# Patient Record
Sex: MALE | Race: AMERICAN INDIAN OR ALASKA NATIVE | NOT HISPANIC OR LATINO | Employment: FULL TIME | URBAN - METROPOLITAN AREA
[De-identification: names, ages, dates, MRNs, and addresses within clinical notes are randomized per-mention and may not be internally consistent; named-entity substitution may affect disease eponyms.]

---
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
## 2021-02-03 ENCOUNTER — HOSPITAL ENCOUNTER (EMERGENCY)
Facility: CLINIC | Age: 21
Discharge: HOME | End: 2021-02-03
Attending: FAMILY MEDICINE | Admitting: FAMILY MEDICINE
Payer: COMMERCIAL

## 2021-02-03 ENCOUNTER — APPOINTMENT (OUTPATIENT)
Dept: CT IMAGING | Facility: CLINIC | Age: 21
End: 2021-02-03
Attending: FAMILY MEDICINE
Payer: COMMERCIAL

## 2021-02-03 VITALS
HEART RATE: 82 BPM | WEIGHT: 259 LBS | HEIGHT: 70 IN | DIASTOLIC BLOOD PRESSURE: 85 MMHG | RESPIRATION RATE: 16 BRPM | OXYGEN SATURATION: 98 % | BODY MASS INDEX: 37.08 KG/M2 | SYSTOLIC BLOOD PRESSURE: 121 MMHG | TEMPERATURE: 97.9 F

## 2021-02-03 DIAGNOSIS — F12.90 MARIJUANA USE, CONTINUOUS: ICD-10-CM

## 2021-02-03 DIAGNOSIS — R56.9 SEIZURE (H): ICD-10-CM

## 2021-02-03 LAB
ALBUMIN SERPL-MCNC: 4.3 G/DL (ref 3.4–5)
ALP SERPL-CCNC: 48 U/L (ref 40–150)
ALT SERPL W P-5'-P-CCNC: 40 U/L (ref 0–70)
AMPHETAMINES UR QL SCN: NEGATIVE
ANION GAP SERPL CALCULATED.3IONS-SCNC: 3 MMOL/L (ref 3–14)
AST SERPL W P-5'-P-CCNC: 24 U/L (ref 0–45)
BARBITURATES UR QL: NEGATIVE
BASOPHILS # BLD AUTO: 0 10E9/L (ref 0–0.2)
BASOPHILS NFR BLD AUTO: 0.4 %
BENZODIAZ UR QL: NEGATIVE
BILIRUB SERPL-MCNC: 0.3 MG/DL (ref 0.2–1.3)
BUN SERPL-MCNC: 13 MG/DL (ref 7–30)
CALCIUM SERPL-MCNC: 9.2 MG/DL (ref 8.5–10.1)
CANNABINOIDS UR QL SCN: POSITIVE
CHLORIDE SERPL-SCNC: 109 MMOL/L (ref 94–109)
CO2 SERPL-SCNC: 25 MMOL/L (ref 20–32)
COCAINE UR QL: NEGATIVE
CREAT SERPL-MCNC: 0.62 MG/DL (ref 0.66–1.25)
DIFFERENTIAL METHOD BLD: ABNORMAL
EOSINOPHIL # BLD AUTO: 0.3 10E9/L (ref 0–0.7)
EOSINOPHIL NFR BLD AUTO: 3.1 %
ERYTHROCYTE [DISTWIDTH] IN BLOOD BY AUTOMATED COUNT: 13 % (ref 10–15)
ETHANOL SERPL-MCNC: <0.01 G/DL
GFR SERPL CREATININE-BSD FRML MDRD: >90 ML/MIN/{1.73_M2}
GLUCOSE SERPL-MCNC: 95 MG/DL (ref 70–99)
HCT VFR BLD AUTO: 46.7 % (ref 40–53)
HGB BLD-MCNC: 15.2 G/DL (ref 13.3–17.7)
IMM GRANULOCYTES # BLD: 0 10E9/L (ref 0–0.4)
IMM GRANULOCYTES NFR BLD: 0.2 %
LABORATORY COMMENT REPORT: NORMAL
LYMPHOCYTES # BLD AUTO: 2.3 10E9/L (ref 0.8–5.3)
LYMPHOCYTES NFR BLD AUTO: 27.1 %
MAGNESIUM SERPL-MCNC: 1.7 MG/DL (ref 1.6–2.3)
MCH RBC QN AUTO: 29.7 PG (ref 26.5–33)
MCHC RBC AUTO-ENTMCNC: 32.5 G/DL (ref 31.5–36.5)
MCV RBC AUTO: 91 FL (ref 78–100)
MONOCYTES # BLD AUTO: 0.7 10E9/L (ref 0–1.3)
MONOCYTES NFR BLD AUTO: 8.4 %
NEUTROPHILS # BLD AUTO: 5.1 10E9/L (ref 1.6–8.3)
NEUTROPHILS NFR BLD AUTO: 60.8 %
NRBC # BLD AUTO: 0 10*3/UL
NRBC BLD AUTO-RTO: 0 /100
OPIATES UR QL SCN: NEGATIVE
PCP UR QL SCN: NEGATIVE
PLATELET # BLD AUTO: 136 10E9/L (ref 150–450)
POTASSIUM SERPL-SCNC: 3.8 MMOL/L (ref 3.4–5.3)
PROT SERPL-MCNC: 7.5 G/DL (ref 6.8–8.8)
RBC # BLD AUTO: 5.12 10E12/L (ref 4.4–5.9)
SARS-COV-2 RNA RESP QL NAA+PROBE: NEGATIVE
SODIUM SERPL-SCNC: 137 MMOL/L (ref 133–144)
SPECIMEN SOURCE: NORMAL
WBC # BLD AUTO: 8.4 10E9/L (ref 4–11)

## 2021-02-03 PROCEDURE — 80053 COMPREHEN METABOLIC PANEL: CPT | Performed by: FAMILY MEDICINE

## 2021-02-03 PROCEDURE — 99284 EMERGENCY DEPT VISIT MOD MDM: CPT | Performed by: FAMILY MEDICINE

## 2021-02-03 PROCEDURE — 83735 ASSAY OF MAGNESIUM: CPT | Performed by: FAMILY MEDICINE

## 2021-02-03 PROCEDURE — 85025 COMPLETE CBC W/AUTO DIFF WBC: CPT | Performed by: FAMILY MEDICINE

## 2021-02-03 PROCEDURE — 70450 CT HEAD/BRAIN W/O DYE: CPT

## 2021-02-03 PROCEDURE — C9803 HOPD COVID-19 SPEC COLLECT: HCPCS | Performed by: FAMILY MEDICINE

## 2021-02-03 PROCEDURE — 87635 SARS-COV-2 COVID-19 AMP PRB: CPT | Performed by: FAMILY MEDICINE

## 2021-02-03 PROCEDURE — 99284 EMERGENCY DEPT VISIT MOD MDM: CPT | Mod: 25 | Performed by: FAMILY MEDICINE

## 2021-02-03 PROCEDURE — 80307 DRUG TEST PRSMV CHEM ANLYZR: CPT | Performed by: FAMILY MEDICINE

## 2021-02-03 PROCEDURE — 82077 ASSAY SPEC XCP UR&BREATH IA: CPT | Performed by: FAMILY MEDICINE

## 2021-02-03 ASSESSMENT — MIFFLIN-ST. JEOR: SCORE: 2191.07

## 2021-02-04 NOTE — ED NOTES
"Pt reports no hx of seizure activity.  Pt reports having meeting yesterday from , pt was on xbox and became tired and laid down and woke up with  Seizure activity at 1500.  Per pt's SO that was present during \"seizure activity\"   Pt was foaming mouth, wouldn't stop moving, bit tongue and shaking for approximately 3-4 minutes.   Pt denies incontinence of urine or bowel during seizure like activity. EMS arrived on scene and pt refused transport.    Blood sugar normal on scene.     Pt came to ER tonight due to family wanting pt to be seen.   Pt's only c/o feeling sore with muscle aches.     "

## 2021-02-04 NOTE — DISCHARGE INSTRUCTIONS
Please call for an appointment with neurology at the contact number given.  Return to the emergency department if worse or changes or recurrent seizure concern.  You should stop smoking marijuana.

## 2021-02-04 NOTE — ED TRIAGE NOTES
Patient states seizure yesterday and EMS was called. He declined admission to hospital at the time but wants to be checked on now.

## 2021-02-15 ENCOUNTER — HOSPITAL ENCOUNTER (EMERGENCY)
Facility: CLINIC | Age: 21
Discharge: HOME | End: 2021-02-15
Attending: PHYSICIAN ASSISTANT | Admitting: PHYSICIAN ASSISTANT
Payer: COMMERCIAL

## 2021-02-15 ENCOUNTER — APPOINTMENT (OUTPATIENT)
Dept: CT IMAGING | Facility: CLINIC | Age: 21
End: 2021-02-15
Attending: EMERGENCY MEDICINE
Payer: COMMERCIAL

## 2021-02-15 VITALS
HEART RATE: 65 BPM | DIASTOLIC BLOOD PRESSURE: 64 MMHG | RESPIRATION RATE: 18 BRPM | TEMPERATURE: 98.9 F | WEIGHT: 275 LBS | SYSTOLIC BLOOD PRESSURE: 104 MMHG | OXYGEN SATURATION: 97 % | BODY MASS INDEX: 39.46 KG/M2

## 2021-02-15 DIAGNOSIS — L02.91 ABSCESS: ICD-10-CM

## 2021-02-15 DIAGNOSIS — K42.9 UMBILICAL HERNIA: ICD-10-CM

## 2021-02-15 LAB
ANION GAP SERPL CALCULATED.3IONS-SCNC: 5 MMOL/L (ref 3–14)
BASOPHILS # BLD AUTO: 0 10E9/L (ref 0–0.2)
BASOPHILS NFR BLD AUTO: 0 %
BUN SERPL-MCNC: 6 MG/DL (ref 7–30)
CALCIUM SERPL-MCNC: 8.8 MG/DL (ref 8.5–10.1)
CHLORIDE SERPL-SCNC: 105 MMOL/L (ref 94–109)
CO2 SERPL-SCNC: 27 MMOL/L (ref 20–32)
CREAT SERPL-MCNC: 0.6 MG/DL (ref 0.66–1.25)
DIFFERENTIAL METHOD BLD: ABNORMAL
EOSINOPHIL # BLD AUTO: 0 10E9/L (ref 0–0.7)
EOSINOPHIL NFR BLD AUTO: 0 %
ERYTHROCYTE [DISTWIDTH] IN BLOOD BY AUTOMATED COUNT: 12.4 % (ref 10–15)
GFR SERPL CREATININE-BSD FRML MDRD: >90 ML/MIN/{1.73_M2}
GLUCOSE SERPL-MCNC: 81 MG/DL (ref 70–99)
HCT VFR BLD AUTO: 46.2 % (ref 40–53)
HGB BLD-MCNC: 15.5 G/DL (ref 13.3–17.7)
LACTATE BLD-SCNC: 0.5 MMOL/L (ref 0.7–2)
LYMPHOCYTES # BLD AUTO: 2.6 10E9/L (ref 0.8–5.3)
LYMPHOCYTES NFR BLD AUTO: 29 %
MCH RBC QN AUTO: 29.8 PG (ref 26.5–33)
MCHC RBC AUTO-ENTMCNC: 33.5 G/DL (ref 31.5–36.5)
MCV RBC AUTO: 89 FL (ref 78–100)
MONOCYTES # BLD AUTO: 0.7 10E9/L (ref 0–1.3)
MONOCYTES NFR BLD AUTO: 8 %
NEUTROPHILS # BLD AUTO: 5.7 10E9/L (ref 1.6–8.3)
NEUTROPHILS NFR BLD AUTO: 63 %
PLATELET # BLD AUTO: 137 10E9/L (ref 150–450)
PLATELET # BLD EST: ABNORMAL 10*3/UL
POTASSIUM SERPL-SCNC: 4 MMOL/L (ref 3.4–5.3)
RBC # BLD AUTO: 5.21 10E12/L (ref 4.4–5.9)
RBC MORPH BLD: NORMAL
SODIUM SERPL-SCNC: 137 MMOL/L (ref 133–144)
VARIANT LYMPHS BLD QL SMEAR: PRESENT
WBC # BLD AUTO: 9.1 10E9/L (ref 4–11)

## 2021-02-15 PROCEDURE — 99285 EMERGENCY DEPT VISIT HI MDM: CPT | Mod: 25 | Performed by: EMERGENCY MEDICINE

## 2021-02-15 PROCEDURE — 96375 TX/PRO/DX INJ NEW DRUG ADDON: CPT | Mod: 59 | Performed by: EMERGENCY MEDICINE

## 2021-02-15 PROCEDURE — 250N000011 HC RX IP 250 OP 636: Performed by: EMERGENCY MEDICINE

## 2021-02-15 PROCEDURE — 10060 I&D ABSCESS SIMPLE/SINGLE: CPT | Performed by: EMERGENCY MEDICINE

## 2021-02-15 PROCEDURE — 83605 ASSAY OF LACTIC ACID: CPT | Performed by: EMERGENCY MEDICINE

## 2021-02-15 PROCEDURE — 74177 CT ABD & PELVIS W/CONTRAST: CPT

## 2021-02-15 PROCEDURE — 250N000009 HC RX 250: Performed by: EMERGENCY MEDICINE

## 2021-02-15 PROCEDURE — 85025 COMPLETE CBC W/AUTO DIFF WBC: CPT | Performed by: EMERGENCY MEDICINE

## 2021-02-15 PROCEDURE — 80048 BASIC METABOLIC PNL TOTAL CA: CPT | Performed by: EMERGENCY MEDICINE

## 2021-02-15 PROCEDURE — 96374 THER/PROPH/DIAG INJ IV PUSH: CPT | Mod: 59 | Performed by: EMERGENCY MEDICINE

## 2021-02-15 RX ORDER — FLUMAZENIL 0.1 MG/ML
0.2 INJECTION, SOLUTION INTRAVENOUS ONCE
Status: DISCONTINUED | OUTPATIENT
Start: 2021-02-15 | End: 2021-02-15

## 2021-02-15 RX ORDER — ONDANSETRON 2 MG/ML
4 INJECTION INTRAMUSCULAR; INTRAVENOUS EVERY 30 MIN PRN
Status: DISCONTINUED | OUTPATIENT
Start: 2021-02-15 | End: 2021-02-16 | Stop reason: HOSPADM

## 2021-02-15 RX ORDER — IOPAMIDOL 755 MG/ML
100 INJECTION, SOLUTION INTRAVASCULAR ONCE
Status: COMPLETED | OUTPATIENT
Start: 2021-02-15 | End: 2021-02-15

## 2021-02-15 RX ORDER — HYDROMORPHONE HYDROCHLORIDE 1 MG/ML
0.5 INJECTION, SOLUTION INTRAMUSCULAR; INTRAVENOUS; SUBCUTANEOUS
Status: DISCONTINUED | OUTPATIENT
Start: 2021-02-15 | End: 2021-02-16 | Stop reason: HOSPADM

## 2021-02-15 RX ADMIN — SODIUM CHLORIDE 80 ML: 9 INJECTION, SOLUTION INTRAVENOUS at 20:52

## 2021-02-15 RX ADMIN — ONDANSETRON 4 MG: 2 INJECTION INTRAMUSCULAR; INTRAVENOUS at 20:38

## 2021-02-15 RX ADMIN — HYDROMORPHONE HYDROCHLORIDE 0.5 MG: 1 INJECTION, SOLUTION INTRAMUSCULAR; INTRAVENOUS; SUBCUTANEOUS at 20:38

## 2021-02-15 RX ADMIN — IOPAMIDOL 100 ML: 755 INJECTION, SOLUTION INTRAVENOUS at 20:52

## 2021-02-15 ASSESSMENT — ENCOUNTER SYMPTOMS
PHOTOPHOBIA: 0
BACK PAIN: 0
VOMITING: 1
ABDOMINAL DISTENTION: 0
PSYCHIATRIC NEGATIVE: 1
CONSTIPATION: 0
FEVER: 0
NEUROLOGICAL NEGATIVE: 1
ACTIVITY CHANGE: 0
LIGHT-HEADEDNESS: 0
HEADACHES: 0
FATIGUE: 0
SORE THROAT: 0
CARDIOVASCULAR NEGATIVE: 1
DIARRHEA: 0
MUSCULOSKELETAL NEGATIVE: 1
CHILLS: 0
APPETITE CHANGE: 1
NAUSEA: 1
COUGH: 0
COLOR CHANGE: 1
SHORTNESS OF BREATH: 0
DIFFICULTY URINATING: 0
RESPIRATORY NEGATIVE: 1
ABDOMINAL PAIN: 1

## 2021-02-16 NOTE — ED PROVIDER NOTES
History     Chief Complaint   Patient presents with     Abdominal Pain     Pt has a possible umbilical hernia (small in size)     HPI  Brendan Garay is a 21 year old male with history of PTSD, and depression who originally presented to urgent care for evaluation of abdominal pain.  Pain is located under his umbilicus and has been constant and progressively worsening over the past 3 days.  He can feel a lump which is tender and painful.  He was initially evaluated in urgent care and concern for umbilical hernia and was transferred to the ED. Pain worsens with movement particularly bending over.  No fevers or chills.  Pain is moderate in severity.  Had one episode of nausea and vomiting earlier today.  Patient thought it could be related to constipation so took magnesium citrate yesterday.  Symptoms did not improve.  No prior abdominal surgeries.    The patient's PMHx, Surgical Hx, Allergies, and Medications were all reviewed with the patient.    Allergies:  Allergies   Allergen Reactions     Seasonal Allergies        Problem List:    Patient Active Problem List    Diagnosis Date Noted     PTSD (post-traumatic stress disorder) 04/03/2018     Priority: Medium     Depression 02/27/2018     Priority: Medium     Childhood obesity 02/13/2012     Priority: Medium     Heart murmur 02/13/2012     Priority: Medium     ADHD (attention deficit hyperactivity disorder) 02/13/2012     Priority: Medium     Health Care Home 05/20/2013     Priority: Low     EMERGENCY CARE PLAN  May 20, 2013: No current Care Coordination follow up planned. Please refer if Care Coordination services are needed.    Presenting Problem Signs and Symptoms Treatment Plan   Questions or concerns   during clinic hours   I will call my clinic directly:  [facility]  [id number] [place] [address] [id number] [phone].    Questions or concerns outside clinic hours   I will call the 24 hour nurse line at   [phone] or [phone].   Need  to schedule an appointment   I will call the 24 hour scheduling team at [phone] or my clinic directly at [phone].    Same day treatment     I will call my clinic first, nurse line if after hours, urgent care and express care if needed.   Clinic care coordination services (regular clinic hours)     I will call a clinic care coordinator directly:     Pramod Long RN  Mon, Tues, Fri - [phone]  Wed, Thurs - [phone]    Jerri Laws :    [phone]    Or call my clinic at [phone] and ask to speak with care coordination.   Crisis Services: Behavioral or Mental Health  [facility] 24 Hour Phone Line  [phone]    [facility] 24 Hour Crisis Services  [phone]    [facility] [phone]    [facility]   [phone]       Emergency treatment -- Immediately    CAll 911             Past Medical History:    Past Medical History:   Diagnosis Date     Depressive disorder        Past Surgical History:    Past Surgical History:   Procedure Laterality Date     NO HISTORY OF SURGERY         Family History:    Family History   Problem Relation Age of Onset     Substance Abuse Mother      Arthritis Father      Anxiety Disorder Father      Depression Father      Substance Abuse Father        Social History:  Marital Status:  Single [1]  Social History     Tobacco Use     Smoking status: Former Smoker     Packs/day: 0.50     Types: Cigarettes     Quit date: 2021     Years since quittin.1     Smokeless tobacco: Never Used   Substance Use Topics     Alcohol use: No     Alcohol/week: 0.0 standard drinks     Drug use: Yes     Types: Marijuana, Opiates, Benzodiazepines        Medications:         amphetamine-dextroamphetamine (ADDERALL XR) 20 MG per 24 hr capsule       ibuprofen (ADVIL/MOTRIN) 600 MG tablet       loratadine (CLARITIN) 10 MG tablet       Multiple Vitamin (MULTI-VITAMIN PO)       sertraline (ZOLOFT) 100 MG tablet        tretinoin (RETIN-A) 0.025 % cream          Review of Systems   Constitutional: Negative for chills and fever.   HENT: Negative for sore throat.    Eyes: Negative for photophobia.   Respiratory: Negative for cough and shortness of breath.    Cardiovascular: Negative for chest pain and leg swelling.   Gastrointestinal: Positive for abdominal pain, nausea and vomiting. Negative for constipation and diarrhea.   Genitourinary: Negative for difficulty urinating.   Musculoskeletal: Negative for back pain.   Skin: Positive for color change.   Neurological: Negative for light-headedness and headaches.       Physical Exam   BP: 132/76  Pulse: 64  Temp: 98.9  F (37.2  C)  Resp: 18  Weight: 124.7 kg (275 lb)  SpO2: 100 %    Physical Exam  GEN: Awake, alert, and cooperative.  Appears mildly stressed but nontoxic.  HENT: MMM. External ears and nose normal bilaterally.  EYES: EOM intact. Conjunctiva clear. No discharge.   NECK: Symmetric, freely mobile.   CV : Regular rate and rhythm.  Extremities warm and well perfused.  Capillary refill brisk  PULM: Normal effort. No wheezes, rales, or rhonchi bilaterally.  ABD: Palpable lesion in umbilicus which is tender and erythematous concerning for umbilical hernia.  In the umbilicus there is a small spherical tense erythematous nodule with a small amount of serous drainage.  No peritoneal signs.  Remainder of abdomen nontender.  NEURO: Normal speech. Following commands. CN II-XII grossly intact. Answering questions and interacting appropriately.   EXT: No gross deformity. Warm and well perfused  INT: Warm. No diaphoresis. Normal color.        ED Course        [facility]    PROCEDURE: -Incision/Drainage    Date/Time: 2/15/2021 11:16 PM  Performed by: Carlo Zuleta MD  Authorized by: Carlo Zuleta MD       LOCATION:      Type:  Abscess    Size:  1    Location:  Trunk    Trunk location:  Abdomen    ANESTHESIA (see MAR for exact dosages):      Anesthesia method:  Local infiltration    Local anesthetic:  Lidocaine 1% WITH epi    PROCEDURE TYPE:     Complexity:  Simple    PROCEDURE DETAILS:     Needle aspiration: no      Incision types:  Stab incision    Incision depth:  Dermal    Scalpel blade:  11    Wound management:  Probed and deloculated    Drainage:  Purulent    Drainage amount:  Moderate    Wound treatment:  Wound left open    Packing materials:  None  Post-procedure details:     PROCEDURE   Patient Tolerance:  Patient tolerated the procedure well with no immediate complications                 Critical Care time:  none               Results for orders placed or performed during the hospital encounter of 02/15/21 (from the past 24 hour(s))   CBC with platelets differential   Result Value Ref Range    WBC 9.1 4.0 - 11.0 10e9/L    RBC Count 5.21 4.4 - 5.9 10e12/L    Hemoglobin 15.5 13.3 - 17.7 g/dL    Hematocrit 46.2 40.0 - 53.0 %    MCV 89 78 - 100 fl    MCH 29.8 26.5 - 33.0 pg    MCHC 33.5 31.5 - 36.5 g/dL    RDW 12.4 10.0 - 15.0 %    Platelet Count 137 (L) 150 - 450 10e9/L    Diff Method Manual Differential     % Neutrophils 63.0 %    % Lymphocytes 29.0 %    % Monocytes 8.0 %    % Eosinophils 0.0 %    % Basophils 0.0 %    Absolute Neutrophil 5.7 1.6 - 8.3 10e9/L    Absolute Lymphocytes 2.6 0.8 - 5.3 10e9/L    Absolute Monocytes 0.7 0.0 - 1.3 10e9/L    Absolute Eosinophils 0.0 0.0 - 0.7 10e9/L    Absolute Basophils 0.0 0.0 - 0.2 10e9/L    Reactive Lymphs Present     RBC Morphology Normal     Platelet Estimate Confirming automated cell count    Basic metabolic panel   Result Value Ref Range    Sodium 137 133 - 144 mmol/L    Potassium 4.0 3.4 - 5.3 mmol/L    Chloride 105 94 - 109 mmol/L    Carbon Dioxide 27 20 - 32 mmol/L    Anion Gap 5 3 - 14 mmol/L    Glucose 81 70 - 99 mg/dL    Urea Nitrogen 6 (L) 7 - 30 mg/dL    Creatinine 0.60 (L) 0.66 - 1.25 mg/dL    GFR Estimate >90 >60 mL/min/[1.73_m2]    GFR Estimate If Black >90 >60 mL/min/[1.73_m2]     Calcium 8.8 8.5 - 10.1 mg/dL   Lactic acid whole blood   Result Value Ref Range    Lactic Acid 0.5 (L) 0.7 - 2.0 mmol/L   CT Abdomen Pelvis w Contrast    Narrative    EXAM: CT ABDOMEN PELVIS W CONTRAST  LOCATION: [facility]  DATE/TIME: 2/15/2021 8:51 PM    INDICATION: Abdominal pain, umbilical hernia suspected  COMPARISON: None.  TECHNIQUE: CT scan of the abdomen and pelvis was performed following injection of IV contrast. Multiplanar reformats were obtained. Dose reduction techniques were used.  CONTRAST: 100mL Isovue-370    FINDINGS:   LOWER CHEST: Normal. Incidental subcentimeter calcified granuloma in the left lower lobe.    HEPATOBILIARY: Mild fatty infiltration. No suspicious liver lesion. No calcified gallstones.    PANCREAS: Normal.    SPLEEN: Normal.    ADRENAL GLANDS: Normal.    KIDNEYS/BLADDER: No significant mass, stone, or hydronephrosis.    BOWEL: No bowel obstruction. No free air, free fluid, or inflammatory change.     There is a 1.2 x 2.2 x 0.8 cm mildly enhancing umbilical fluid collection. There is mild adjacent subcutaneous inflammatory change. Tiny adjacent fat-containing umbilical hernia.    LYMPH NODES: Normal.    VASCULATURE: Unremarkable.    PELVIC ORGANS: Normal.    MUSCULOSKELETAL: Normal.      Impression    IMPRESSION:   1.  Small umbilical abscess suggested.  2.  Tiny fat-containing umbilical hernia.       Medications   ondansetron (ZOFRAN) injection 4 mg (4 mg Intravenous Given 2/15/21 2038)   HYDROmorphone (PF) (DILAUDID) injection 0.5 mg (0.5 mg Intravenous Given 2/15/21 2038)   iopamidol (ISOVUE-370) solution 100 mL (100 mLs Intravenous Given 2/15/21 2052)   sodium chloride 0.9 % bag 500mL for CT scan flush use (80 mLs As instructed Given 2/15/21 2052)       Assessments & Plan (with Medical Decision Making)   21 year old male with 3 days of periumbilical pain with tender and red lesion and umbilicus.  On abdominal exam he does appear to have a small umbilical hernia as  well as concern for abscess.  CBC without leukocytosis or anemia.  BMP grossly normal.  Lactic acid is not elevated.  CT scan of abdomen and pelvis with 1.2 x 2.2 x 0.8 cm enhancing umbilical fluid collection suggestive of abscess as well as a tiny adjacent fat-containing umbilical hernia.  Discussed options with patient and elected to proceed with I&D.  Purulent discharge from I&D.  Wound care and follow-up instructions discussed with patient.  He expresses agreement understanding of plan and discharged in improved condition.  Plan for ibuprofen as needed for pain control.  No signs of adjacent cellulitis, no antimicrobial therapy initiated.    I have reviewed the nursing notes.         Discharge Medication List as of 2/15/2021 10:38 PM          Final diagnoses:   Umbilical hernia   Abscess - periumbilical     Carlo Zuleta MD    2/15/2021   [facility] EMERGENCY DEPT    Disclaimer: This note consists of words and symbols derived from keyboarding and dictation using voice recognition software.  As a result, there may be errors that have gone undetected.  Please consider this when interpreting information found in this note.             Carlo Zuleta MD  02/15/21 1700

## 2021-02-16 NOTE — DISCHARGE INSTRUCTIONS
You have a small abscess overlying your bellybutton.  I recommend warm soaks and bathtub and allow the abscess to drain.  If it reforms becomes tense it may need to be drained again.  If you develop any redness, pain, or warmth of the skin around your bellybutton you should return to the emergency department for further evaluation.    You have a small umbilical hernia which does not contain any bowel.  I recommend ibuprofen for pain control.

## 2021-02-16 NOTE — ED PROVIDER NOTES
History     Chief Complaint   Patient presents with     Abdominal Pain     Pt has a possible umbilical hernia (small in size)     HPI  Brendan Garay is a 21 year old male with history of PTSD, depression, heart murmur, ADHD who presents to the urgent care and transferred to the Emergency Room for abdominal pain. Patient states 3 days ago he started noticing periumbilical abdominal pain that has been constant and slightly worsening over the past few days.  Patient also notes a lump that is tender, swollen and painful in his umbilicus. Patient states he thought this may be related to constipation and took some mag citrate yesterday. Patient has had some small bowel movements. Patient denies fevers, diarrhea, bloody or black tarry stools. Patient with 1 episode of emesis today. Patient states similar symptoms a few years back and he was told it was constipation, so that is why he tried the mag citrate.     Allergies:  Allergies   Allergen Reactions     Seasonal Allergies        Problem List:    Patient Active Problem List    Diagnosis Date Noted     PTSD (post-traumatic stress disorder) 04/03/2018     Priority: Medium     Depression 02/27/2018     Priority: Medium     Childhood obesity 02/13/2012     Priority: Medium     Heart murmur 02/13/2012     Priority: Medium     ADHD (attention deficit hyperactivity disorder) 02/13/2012     Priority: Medium     Health Care Home 05/20/2013     Priority: Low     EMERGENCY CARE PLAN  May 20, 2013: No current Care Coordination follow up planned. Please refer if Care Coordination services are needed.    Presenting Problem Signs and Symptoms Treatment Plan   Questions or concerns   during clinic hours   I will call my clinic directly:  [address] [id number] [phone].    Questions or concerns outside clinic hours   I will call the 24 hour nurse line at   [phone] or [phone]-[phone].   Need to schedule an appointment   I will call  the 24 hour scheduling team at [phone] or my clinic directly at [phone].    Same day treatment     I will call my clinic first, nurse line if after hours, urgent care and express care if needed.   Clinic care coordination services (regular clinic hours)     I will call a clinic care coordinator directly:     Pramod Long RN  Mon, Tu, Fri - [phone]  Wed, Thurs - [phone]    Jerri Laws :    [phone]    Or call my clinic at [phone] and ask to speak with care coordination.   Crisis Services: Behavioral or Mental Health  [facility] 24 Hour Phone Line  [phone]    [facility] 24 Hour Crisis Services  [phone]    [facility] ([facility]) Network [phone]    [facility]   [phone]       Emergency treatment -- Immediately    CAll 911             Past Medical History:    Past Medical History:   Diagnosis Date     Depressive disorder        Past Surgical History:    Past Surgical History:   Procedure Laterality Date     NO HISTORY OF SURGERY         Family History:    Family History   Problem Relation Age of Onset     Substance Abuse Mother      Arthritis Father      Anxiety Disorder Father      Depression Father      Substance Abuse Father        Social History:  Marital Status:  Single [1]  Social History     Tobacco Use     Smoking status: Former Smoker     Packs/day: 0.50     Types: Cigarettes     Quit date: 2021     Years since quittin.1     Smokeless tobacco: Never Used   Substance Use Topics     Alcohol use: No     Alcohol/week: 0.0 standard drinks     Drug use: Yes     Types: Marijuana, Opiates, Benzodiazepines        Medications:         amphetamine-dextroamphetamine (ADDERALL XR) 20 MG per 24 hr capsule       ibuprofen (ADVIL/MOTRIN) 600 MG tablet       loratadine (CLARITIN) 10 MG tablet       Multiple Vitamin (MULTI-VITAMIN PO)       sertraline (ZOLOFT) 100 MG tablet       tretinoin (RETIN-A) 0.025 %  "cream          Review of Systems   Constitutional: Positive for appetite change. Negative for activity change, fatigue and fever.   HENT: Negative.    Respiratory: Negative.    Cardiovascular: Negative.    Gastrointestinal: Positive for abdominal pain and vomiting. Negative for abdominal distention and diarrhea.        Umbilical \"lump\" with pain and redness.    Genitourinary: Negative.    Musculoskeletal: Negative.    Skin: Negative.    Neurological: Negative.    Psychiatric/Behavioral: Negative.    All other systems reviewed and are negative.      Physical Exam   BP: 132/76  Pulse: 64  Temp: 98.9  F (37.2  C)  Resp: 18  Weight: 124.7 kg (275 lb)  SpO2: 100 %      Physical Exam  Vitals signs and nursing note reviewed.   Constitutional:       General: He is not in acute distress.     Appearance: He is well-developed and normal weight. He is not ill-appearing or toxic-appearing.   Eyes:      General: No scleral icterus.     Extraocular Movements: Extraocular movements intact.      Pupils: Pupils are equal, round, and reactive to light.   Cardiovascular:      Rate and Rhythm: Normal rate and regular rhythm.      Heart sounds: Normal heart sounds.   Pulmonary:      Effort: Pulmonary effort is normal.      Breath sounds: Normal breath sounds.   Abdominal:      General: Abdomen is protuberant. Bowel sounds are normal.      Palpations: Abdomen is soft.      Tenderness: There is abdominal tenderness in the periumbilical area. There is no guarding or rebound.      Hernia: A hernia is present. Hernia is present in the umbilical area (that is red, swollen, and tender. unable to reduce hernia in exam room today. ).   Skin:     General: Skin is warm.      Capillary Refill: Capillary refill takes less than 2 seconds.      Findings: Erythema (to the umbilical hernia ) present.   Neurological:      General: No focal deficit present.      Mental Status: He is alert and oriented to person, place, and time.   Psychiatric:         Mood " "and Affect: Mood normal.         Behavior: Behavior normal.         ED Course        Procedures              Critical Care time:  none               No results found for this or any previous visit (from the past 24 hour(s)).    Medications - No data to display    Assessments & Plan (with Medical Decision Making)     I have reviewed the nursing notes.    I have reviewed the findings, diagnosis, plan and need for follow up with the patient.   21-year-old male presents the urgent care today with 3 days of abdominal pain and umbilical \"lump\" that has been painful, red and swollen.  See exam findings above.  Exam findings consistent with possibly strangulated or incarcerated umbilical hernia.  Patient sent over the emergency department from the urgent care for further evaluation work-up.  Patient transferred to the ER in stable condition in no acute distress.    New Prescriptions    No medications on file       Final diagnoses:   Umbilical hernia       2/15/2021   [facility] EMERGENCY DEPT     Rosi Arguello PA-C  02/15/21 1938    "

## 2021-03-08 ENCOUNTER — PRE VISIT (OUTPATIENT)
Dept: NEUROLOGY | Facility: CLINIC | Age: 21
End: 2021-03-08

## 2021-03-08 NOTE — TELEPHONE ENCOUNTER
FUTURE VISIT INFORMATION:    Date: 3/9/21    Time:  1440    Location: [facility]   REFERRAL INFORMATION:    Referring provider:  [facility]    Referring providers clinic:     Reason for visit/diagnosis:  Possible first seizure       NOTES (FOR ALL VISITS) STATUS DETAILS   OFFICE NOTE from referring provider Printed    OFFICE NOTE from other specialist Printed     DISCHARGE SUMMARY from hospital       DISCHARGE REPORT from the ER Printed [facility] 2/3/21   MEDICATION LIST In Epic     IMAGING  (FOR ALL VISITS)       EMG       EEG    None   MRI (HEAD, NECK, SPINE) Printed  Images in PACs CT head 2/3/21   CARDIAC STUDIES        FORMAL COGNITIVE TESTING       OTHER

## 2021-03-09 ENCOUNTER — OFFICE VISIT (OUTPATIENT)
Dept: NEUROLOGY | Facility: CLINIC | Age: 21
End: 2021-03-09
Payer: COMMERCIAL

## 2021-03-09 VITALS — TEMPERATURE: 98.3 F | SYSTOLIC BLOOD PRESSURE: 117 MMHG | HEART RATE: 72 BPM | DIASTOLIC BLOOD PRESSURE: 65 MMHG

## 2021-03-09 DIAGNOSIS — R56.9 FIRST TIME SEIZURE (H): Primary | ICD-10-CM

## 2021-03-09 PROCEDURE — 99204 OFFICE O/P NEW MOD 45 MIN: CPT | Performed by: PSYCHIATRY & NEUROLOGY

## 2021-03-09 NOTE — PROGRESS NOTES
INITIAL NEUROLOGY CONSULTATION    DATE OF VISIT: 3/9/2021  MRN: [id number]  PATIENT NAME: Brendan Garay  YOB: 2000    REFERRING PROVIDER: No ref. provider found    Chief Complaint   Patient presents with     New Patient     Possible first seizure.  Referral by [facility] ED       SUBJECTIVE:                                                      HPI:   Brendan Garay is a 21 year old male whom I have been asked by [place] ED team to see in consultation for possible first-time seizure. Per chart review, the patient was in the ED on 2.3.21 discerns of seizure activity that occurred the day prior. The patient himself did not remember much about the details except for laying down for a nap. His also said that she saw him shaking and foaming at the mouth for a few minutes thereafter. He was confused when he awoke. He had possibly bitten his tongue. No loss of bladder or bowel control. He was seen by EMS who recommended ED evaluation but he declined and decided to wait until the next day. Possible evidence of mild tongue trauma on the ED exam, otherwise work-up was largely normal.  Basic labs showed slightly decreased platelet count and creatinine level.  Head CT was unremarkable. Urine was positive for cannabinoids, and the patient reported he smokes marijuana daily. He was advised to stop and follow-up with neurology as an outpatient.    The patient has additional history of PTSD, depression, ADHD and obesity.    Arnulfo tells me that he remembers waking up to the EMS providers. He says he was confused afterwards, so he refused to go into the emergency room.  Family was so worried though, he says that he was agreeable to come in for evaluation the following day.  He does endorse that he had some cuts on his tongue after the event.  His girlfriend told him that he was convulsing.  No major changes in lifestyle around the time of the event.  He has been smoking marijuana for quite some time, and always gets  it from the same source.  No synthetic marijuana.  He drinks an occasional beer, but no history of heavy alcohol use.  He says he was not necessarily sleep deprived or sick at the time of the event.    Denies history of previous seizures.  He would occasionally zone out a little bit in school but nothing that was problematic or unexpected with his ADHD.  No history of major head injuries or CNS infection.  Have history of migraine headaches since a young age.  He says that these finally seem to dissipate after he stopped taking Adderall a couple of years ago.    The patient denies any known family history of seizure disorder.    Tells me he has been told that he cannot drive for 3 months.  He says this is not a problem because he does not currently have his license.  He works at [email or web address] in [email or web address].    Past Medical History:   Diagnosis Date     Depressive disorder      Past Surgical History:   Procedure Laterality Date     NO HISTORY OF SURGERY         ibuprofen (ADVIL/MOTRIN) 600 MG tablet, Take 1 tablet (600 mg) by mouth every 6 hours as needed for moderate pain  loratadine (CLARITIN) 10 MG tablet, Take 1 tablet (10 mg) by mouth daily  Multiple Vitamin (MULTI-VITAMIN PO), Take 1 tablet by mouth daily.  tretinoin (RETIN-A) 0.025 % cream, Spread a pea size amount into affected area topically at bedtime.  Use sunscreen SPF>20.  amphetamine-dextroamphetamine (ADDERALL XR) 20 MG per 24 hr capsule, Take 2 capsules (40 mg) by mouth daily (Patient not taking: Reported on 3/9/2021)  sertraline (ZOLOFT) 100 MG tablet, Take 1 tablet (100 mg) by mouth daily (Patient not taking: Reported on 3/9/2021)    acetaminophen (TYLENOL) tablet 650 mg  calcium carbonate (TUMS) chewable tablet 1,000 mg  ibuprofen (ADVIL/MOTRIN) tablet 400 mg      Allergies   Allergen Reactions     Seasonal Allergies         Problem (# of Occurrences) Relation (Name,Age of Onset)    Anxiety Disorder (1) Father (Brendan)    Arthritis (1) Father (Brendan)     Depression (1) Father (Brendan)    Substance Abuse (2) Mother (Mary), Father (Brendan)        Social History     Tobacco Use     Smoking status: Former Smoker     Packs/day: 0.50     Types: Cigarettes     Quit date: 2021     Years since quittin.1     Smokeless tobacco: Never Used     Tobacco comment: E-cig twice daily   Substance Use Topics     Alcohol use: No     Alcohol/week: 0.0 standard drinks     Drug use: Not Currently     Types: Marijuana, Opiates, Benzodiazepines     Comment: 3/9/21 marijuana only       REVIEW OF SYSTEMS:                                                      10-point review of systems is negative except as mentioned above in HPI.     EXAM:                                                      Physical Exam:   Vitals: /65 (BP Location: Right arm, Patient Position: Sitting, Cuff Size: Adult Large)   Pulse 72   Temp 98.3  F (36.8  C) (Tympanic)   BMI= There is no height or weight on file to calculate BMI.  GENERAL: NAD.  HEENT: NC/AT.   CV: RRR. S1, S2.   NECK: No bruits.  PULM: Non-labored breathing.   Neurologic:  MENTAL STATUS: Alert, attentive. Speech is fluent.  Pressured at times.  Normal comprehension.  Fair concentration. Adequate fund of knowledge.   CRANIAL NERVES: Discs technically difficult to visualize. Visual fields intact to confrontation. Pupils equally, round and reactive to light. Facial sensation and movement normal. EOM full. Hearing intact to conversation. Trapezius strength intact. Palate moves symmetrically. Tongue midline.  MOTOR: 5/5 in proximal and distal muscle groups of upper and lower extremities. Tone and bulk normal.   DTRs: Intact and symmetric in biceps, BR, patellae.  Babinski down-going bilaterally.   SENSATION: Normal light touch and pinprick. Intact proprioception at great toes. Vibration: Normal at both ankles.   COORDINATION: Normal finger nose finger. Finger tapping normal. Knee heel shin normal.  STATION AND GAIT: Romberg negative.   Casual gait is normal.  Right hand-dominant.    Relevant Data:  Head CT (2.3.21):  FINDINGS:  INTRACRANIAL CONTENTS: No intracranial hemorrhage, extraaxial collection, or mass effect.  No CT evidence of acute infarct. Normal parenchymal attenuation. Normal ventricles and sulci. Cavum septum pellucidum incidentally noted.     VISUALIZED ORBITS/SINUSES/MASTOIDS: No intraorbital abnormality. No paranasal sinus mucosal disease. No middle ear or mastoid effusion.     BONES/SOFT TISSUES: No acute abnormality.                                                                      IMPRESSION:  1.  Normal head CT.    Imaging reviewed independently by me.  Agree with radiology read.      ASSESSMENT and PLAN:                                                      Assessment:     ICD-10-CM    1. First time seizure (H)  R56.9 EEG        Mr. Garay is a pleasant 21-year-old man with history of PTSD, depression and ADHD here for consultation regarding first-time seizure.  The seizure occurred just over a month ago, and he was seen in the [facility] ED for follow-up the day after.  The description of the event sounds consistent with a generalized tonic-clonic seizure.  There is no clear evidence of a provoking factor in his case.  I do not think that the daily marijuana use played a role.  Head CT was unremarkable neurologic exam is normal today.  I recommend a sleep deprived EEG.  I explained to Arnulfo that even if the study is normal, we cannot completely rule out a seizure disorder.  However, if the study is normal and there is just this one-time event, we do not typically treat with an AED.  We will plan to follow-up as needed, pending the EEG results.  Arnulfo agrees to let me know if any additional seizure-like events occur.    Plan:  -- Sleep deprived EEG.  We will notify you of the results.  If the study comes back normal, this does not completely rule out a seizure disorder but unless you have another seizure we would not  treat in this case.  -- We will plan to follow-up as needed, pending the EEG results.  Please let my office know if another seizure occurs.  You should also seek medical attention again at that time.    Total Time: 45 minutes were spent with the patient and in chart review/documentation (as described above in Assessment and Plan) /coordinating the care.    Dee Rice MD  Neurology    CC: Samuel Slaughter MD    Dragon software used in the dictation of this note.

## 2021-03-09 NOTE — PATIENT INSTRUCTIONS
Plan:  -- Sleep deprived EEG.  We will notify you of the results.  If the study comes back normal, this does not completely rule out a seizure disorder but unless you have another seizure we would not treat in this case.  -- We will plan to follow-up as needed, pending the EEG results.  Please let my office know if another seizure occurs.  You should also seek medical attention again at that time.

## 2021-03-09 NOTE — LETTER
3/9/2021         RE: Brendan Garay  [address]  [place] [address]        Dear Colleague,    Thank you for referring your patient, Brendan Garay, to the [facility] [place]. Please see a copy of my visit note below.    INITIAL NEUROLOGY CONSULTATION    DATE OF VISIT: 3/9/2021  MRN: [id number]  PATIENT NAME: Brendan Garay  YOB: 2000    REFERRING PROVIDER: No ref. provider found    Chief Complaint   Patient presents with     New Patient     Possible first seizure.  Referral by [facility] ED       SUBJECTIVE:                                                      HPI:   Brendan Garay is a 21 year old male whom I have been asked by [place] ED team to see in consultation for possible first-time seizure. Per chart review, the patient was in the ED on 2.3.21 discerns of seizure activity that occurred the day prior. The patient himself did not remember much about the details except for laying down for a nap. His also said that she saw him shaking and foaming at the mouth for a few minutes thereafter. He was confused when he awoke. He had possibly bitten his tongue. No loss of bladder or bowel control. He was seen by EMS who recommended ED evaluation but he declined and decided to wait until the next day. Possible evidence of mild tongue trauma on the ED exam, otherwise work-up was largely normal.  Basic labs showed slightly decreased platelet count and creatinine level.  Head CT was unremarkable. Urine was positive for cannabinoids, and the patient reported he smokes marijuana daily. He was advised to stop and follow-up with neurology as an outpatient.    The patient has additional history of PTSD, depression, ADHD and obesity.    Arnulfo tells me that he remembers waking up to the EMS providers. He says he was confused afterwards, so he refused to go into the emergency room.  Family was so worried though, he says that he was agreeable to come in for evaluation the  following day.  He does endorse that he had some cuts on his tongue after the event.  His girlfriend told him that he was convulsing.  No major changes in lifestyle around the time of the event.  He has been smoking marijuana for quite some time, and always gets it from the same source.  No synthetic marijuana.  He drinks an occasional beer, but no history of heavy alcohol use.  He says he was not necessarily sleep deprived or sick at the time of the event.    Denies history of previous seizures.  He would occasionally zone out a little bit in school but nothing that was problematic or unexpected with his ADHD.  No history of major head injuries or CNS infection.  Have history of migraine headaches since a young age.  He says that these finally seem to dissipate after he stopped taking Adderall a couple of years ago.    The patient denies any known family history of seizure disorder.    Tells me he has been told that he cannot drive for 3 months.  He says this is not a problem because he does not currently have his license.  He works at [employer] in [place].    Past Medical History:   Diagnosis Date     Depressive disorder      Past Surgical History:   Procedure Laterality Date     NO HISTORY OF SURGERY         ibuprofen (ADVIL/MOTRIN) 600 MG tablet, Take 1 tablet (600 mg) by mouth every 6 hours as needed for moderate pain  loratadine (CLARITIN) 10 MG tablet, Take 1 tablet (10 mg) by mouth daily  Multiple Vitamin (MULTI-VITAMIN PO), Take 1 tablet by mouth daily.  tretinoin (RETIN-A) 0.025 % cream, Spread a pea size amount into affected area topically at bedtime.  Use sunscreen SPF>20.  amphetamine-dextroamphetamine (ADDERALL XR) 20 MG per 24 hr capsule, Take 2 capsules (40 mg) by mouth daily (Patient not taking: Reported on 3/9/2021)  sertraline (ZOLOFT) 100 MG tablet, Take 1 tablet (100 mg) by mouth daily (Patient not taking: Reported on 3/9/2021)    acetaminophen (TYLENOL) tablet 650 mg  calcium carbonate (TUMS)  chewable tablet 1,000 mg  ibuprofen (ADVIL/MOTRIN) tablet 400 mg      Allergies   Allergen Reactions     Seasonal Allergies         Problem (# of Occurrences) Relation (Name,Age of Onset)    Anxiety Disorder (1) Father (Brendan)    Arthritis (1) Father (Brendan)    Depression (1) Father (Brendan)    Substance Abuse (2) Mother (Mary), Father (Brendan)        Social History     Tobacco Use     Smoking status: Former Smoker     Packs/day: 0.50     Types: Cigarettes     Quit date: 2021     Years since quittin.1     Smokeless tobacco: Never Used     Tobacco comment: E-cig twice daily   Substance Use Topics     Alcohol use: No     Alcohol/week: 0.0 standard drinks     Drug use: Not Currently     Types: Marijuana, Opiates, Benzodiazepines     Comment: 3/9/21 marijuana only       REVIEW OF SYSTEMS:                                                      10-point review of systems is negative except as mentioned above in HPI.     EXAM:                                                      Physical Exam:   Vitals: /65 (BP Location: Right arm, Patient Position: Sitting, Cuff Size: Adult Large)   Pulse 72   Temp 98.3  F (36.8  C) (Tympanic)   BMI= There is no height or weight on file to calculate BMI.  GENERAL: NAD.  HEENT: NC/AT.   CV: RRR. S1, S2.   NECK: No bruits.  PULM: Non-labored breathing.   Neurologic:  MENTAL STATUS: Alert, attentive. Speech is fluent.  Pressured at times.  Normal comprehension.  Fair concentration. Adequate fund of knowledge.   CRANIAL NERVES: Discs technically difficult to visualize. Visual fields intact to confrontation. Pupils equally, round and reactive to light. Facial sensation and movement normal. EOM full. Hearing intact to conversation. Trapezius strength intact. Palate moves symmetrically. Tongue midline.  MOTOR: 5/5 in proximal and distal muscle groups of upper and lower extremities. Tone and bulk normal.   DTRs: Intact and symmetric in biceps, BR, patellae.  Babinski  down-going bilaterally.   SENSATION: Normal light touch and pinprick. Intact proprioception at great toes. Vibration: Normal at both ankles.   COORDINATION: Normal finger nose finger. Finger tapping normal. Knee heel shin normal.  STATION AND GAIT: Romberg negative.  Casual gait is normal.  Right hand-dominant.    Relevant Data:  Head CT (2.3.21):  FINDINGS:  INTRACRANIAL CONTENTS: No intracranial hemorrhage, extraaxial collection, or mass effect.  No CT evidence of acute infarct. Normal parenchymal attenuation. Normal ventricles and sulci. Cavum septum pellucidum incidentally noted.     VISUALIZED ORBITS/SINUSES/MASTOIDS: No intraorbital abnormality. No paranasal sinus mucosal disease. No middle ear or mastoid effusion.     BONES/SOFT TISSUES: No acute abnormality.                                                                      IMPRESSION:  1.  Normal head CT.    Imaging reviewed independently by me.  Agree with radiology read.      ASSESSMENT and PLAN:                                                      Assessment:     ICD-10-CM    1. First time seizure (H)  R56.9 EEG        Mr. Garay is a pleasant 21-year-old man with history of PTSD, depression and ADHD here for consultation regarding first-time seizure.  The seizure occurred just over a month ago, and he was seen in the [facility] ED for follow-up the day after.  The description of the event sounds consistent with a generalized tonic-clonic seizure.  There is no clear evidence of a provoking factor in his case.  I do not think that the daily marijuana use played a role.  Head CT was unremarkable neurologic exam is normal today.  I recommend a sleep deprived EEG.  I explained to Arnulfo that even if the study is normal, we cannot completely rule out a seizure disorder.  However, if the study is normal and there is just this one-time event, we do not typically treat with an AED.  We will plan to follow-up as needed, pending the EEG results.  Arnulfo agrees  to let me know if any additional seizure-like events occur.    Plan:  -- Sleep deprived EEG.  We will notify you of the results.  If the study comes back normal, this does not completely rule out a seizure disorder but unless you have another seizure we would not treat in this case.  -- We will plan to follow-up as needed, pending the EEG results.  Please let my office know if another seizure occurs.  You should also seek medical attention again at that time.    Total Time: 45 minutes were spent with the patient and in chart review/documentation (as described above in Assessment and Plan) /coordinating the care.    Dee Rice MD  Neurology    CC: Samuel Slaughter MD    Dragon software used in the dictation of this note.        Again, thank you for allowing me to participate in the care of your patient.        Sincerely,        Dee Rice MD

## 2021-08-23 ENCOUNTER — OFFICE VISIT (OUTPATIENT)
Dept: FAMILY MEDICINE | Facility: CLINIC | Age: 21
End: 2021-08-23
Payer: COMMERCIAL

## 2021-08-23 VITALS
TEMPERATURE: 98.5 F | HEART RATE: 60 BPM | RESPIRATION RATE: 20 BRPM | WEIGHT: 262.8 LBS | DIASTOLIC BLOOD PRESSURE: 71 MMHG | SYSTOLIC BLOOD PRESSURE: 133 MMHG | HEIGHT: 69 IN | OXYGEN SATURATION: 98 % | BODY MASS INDEX: 38.92 KG/M2

## 2021-08-23 DIAGNOSIS — K64.4 EXTERNAL HEMORRHOIDS: Primary | ICD-10-CM

## 2021-08-23 PROCEDURE — 99212 OFFICE O/P EST SF 10 MIN: CPT | Performed by: FAMILY MEDICINE

## 2021-08-23 ASSESSMENT — MIFFLIN-ST. JEOR: SCORE: 2192.43

## 2021-08-23 NOTE — PROGRESS NOTES
"SUBJECTIVE:                                                    Brendan Garay is a 21 year old male who presents to clinic today for the following health issues:    Hemorrhoids  Onset: 3 or 4 days    Description:   Pain: YES- there was pain initially, but no longer  Itching: no     Accompanying Signs & Symptoms:  Blood streaked toilet paper: no   Blood in stool: no   Changes in stool pattern: no     History:   Any previous GI studies done:none  Family History of colon cancer: no     Precipitating factors:   None    Alleviating factors:  None    Therapies Tried and outcome: none    Patient feels he may have hemorrhoids.  It just doesn't feel \"right\" back there.        Problem list and histories reviewed & adjusted, as indicated.  Additional history:     Patient Active Problem List   Diagnosis     Childhood obesity     Heart murmur     ADHD (attention deficit hyperactivity disorder)     Health Care Home     Depression     PTSD (post-traumatic stress disorder)     Past Surgical History:   Procedure Laterality Date     NO HISTORY OF SURGERY         Social History     Tobacco Use     Smoking status: Former Smoker     Packs/day: 0.50     Types: Cigarettes     Quit date: 2021     Years since quittin.6     Smokeless tobacco: Never Used     Tobacco comment: E-cig twice daily   Substance Use Topics     Alcohol use: No     Alcohol/week: 0.0 standard drinks     Family History   Problem Relation Age of Onset     Substance Abuse Mother      Arthritis Father      Anxiety Disorder Father      Depression Father      Substance Abuse Father          Current Outpatient Medications   Medication Sig Dispense Refill     ibuprofen (ADVIL/MOTRIN) 600 MG tablet Take 1 tablet (600 mg) by mouth every 6 hours as needed for moderate pain 30 tablet 1     loratadine (CLARITIN) 10 MG tablet Take 1 tablet (10 mg) by mouth daily 90 tablet 1     Multiple Vitamin (MULTI-VITAMIN PO) Take 1 tablet by mouth daily.       tretinoin (RETIN-A) " "0.025 % cream Spread a pea size amount into affected area topically at bedtime.  Use sunscreen SPF>20. 45 g 11     amphetamine-dextroamphetamine (ADDERALL XR) 20 MG per 24 hr capsule Take 2 capsules (40 mg) by mouth daily (Patient not taking: Reported on 3/9/2021) 60 capsule 0     sertraline (ZOLOFT) 100 MG tablet Take 1 tablet (100 mg) by mouth daily (Patient not taking: Reported on 3/9/2021) 90 tablet 1     Allergies   Allergen Reactions     Seasonal Allergies      BP Readings from Last 3 Encounters:   08/23/21 133/71   03/09/21 117/65   02/15/21 104/64    Wt Readings from Last 3 Encounters:   08/23/21 119.2 kg (262 lb 12.8 oz)   02/15/21 124.7 kg (275 lb)   02/03/21 117.5 kg (259 lb)               ROS:  Constitutional, HEENT, cardiovascular, pulmonary, gi and gu systems are negative, except as otherwise noted.    OBJECTIVE:                                                    /71 (BP Location: Right arm, Patient Position: Sitting, Cuff Size: Adult Large)   Pulse 60   Temp 98.5  F (36.9  C) (Tympanic)   Resp 20   Ht 1.753 m (5' 9\")   Wt 119.2 kg (262 lb 12.8 oz)   SpO2 98%   BMI 38.81 kg/m   Body mass index is 38.81 kg/m .   GENERAL: healthy, alert, well nourished, well hydrated, no distress  HENT: ear canals- normal; TMs- normal; Nose- normal; Mouth- no ulcers, no lesions  NECK: no tenderness, no adenopathy, no asymmetry, no masses, no stiffness; thyroid- normal to palpation  RESP: lungs clear to auscultation - no rales, no rhonchi, no wheezes  CV: regular rates and rhythm, normal S1 S2, no S3 or S4 and no murmur, no click or rub -  ABDOMEN: soft, no tenderness, no  hepatosplenomegaly, no masses, normal bowel sounds  Rectal:  Has 1 moderate non thrombosed externial hemerrhoid.         ASSESSMENT/PLAN:                                                      (K64.4) External hemorrhoids  (primary encounter diagnosis)  Fiber long term  Keep stools soft.          reports that he quit smoking about 7 months " ago. His smoking use included cigarettes. He smoked 0.50 packs per day. He has never used smokeless tobacco.      Weight management plan: Discussed healthy diet and exercise guidelines      [facility]

## 2021-09-16 ENCOUNTER — OFFICE VISIT (OUTPATIENT)
Dept: FAMILY MEDICINE | Facility: CLINIC | Age: 21
End: 2021-09-16
Payer: COMMERCIAL

## 2021-09-16 VITALS
HEART RATE: 81 BPM | DIASTOLIC BLOOD PRESSURE: 78 MMHG | SYSTOLIC BLOOD PRESSURE: 126 MMHG | OXYGEN SATURATION: 98 % | RESPIRATION RATE: 16 BRPM | WEIGHT: 266 LBS | HEIGHT: 69 IN | TEMPERATURE: 98.8 F | BODY MASS INDEX: 39.4 KG/M2

## 2021-09-16 DIAGNOSIS — L72.0 RUPTURED SEBACEOUS CYST: Primary | ICD-10-CM

## 2021-09-16 PROCEDURE — 99213 OFFICE O/P EST LOW 20 MIN: CPT | Performed by: NURSE PRACTITIONER

## 2021-09-16 ASSESSMENT — MIFFLIN-ST. JEOR: SCORE: 2206.95

## 2021-09-16 NOTE — PROGRESS NOTES
"    Assessment & Plan     Ruptured sebaceous cyst  This is a repetitive recurring cyst in the belly button that needs excision.  Referral placed to general surgery for consult.  Follow-up recommended in clinic as needed.    See Patient Instructions    Return if symptoms worsen or fail to improve.    Radha Anne NP  [facility]    Annamarie Arredondo is a 21 year old who presents for the following health issues     HPI     Concern - Lump   Onset: 1 week   Description: patient has a red lump inside his belly button.   He states it popped on its own last night with moderate drainage.   Intensity: moderate  Progression of Symptoms:  same and constant  Accompanying Signs & Symptoms: none  Previous history of similar problem: patient states he has had this happen before and he went to the ER and all they did was pop it. Visit was on 2/15/2021.   Precipitating factors:        Worsened by: none  Alleviating factors:        Improved by: bath with antibacterial soap   Therapies tried and outcome: Bath and pressure was released when it popped last night .     Review of Systems   CONSTITUTIONAL: NEGATIVE for fever, chills, change in weight  INTEGUMENTARY/SKIN: POSITIVE for lump inside belly button as per HPI  RESP: NEGATIVE for significant cough or SOB  CV: NEGATIVE for chest pain, palpitations or peripheral edema  PSYCHIATRIC: NEGATIVE for changes in mood or affect  ROS otherwise negative      Objective    /78   Pulse 81   Temp 98.8  F (37.1  C) (Tympanic)   Resp 16   Ht 1.753 m (5' 9\")   Wt 120.7 kg (266 lb)   SpO2 98%   BMI 39.28 kg/m    Body mass index is 39.28 kg/m .  Physical Exam   GENERAL: healthy, alert and no distress  SKIN: patient has protruding cyst in umbilicus at 9 o'clock that I am unable to express anything from at this time.  No signs or symptoms of infection.  PSYCH: mentation appears normal, affect normal/bright    "

## 2021-09-16 NOTE — PATIENT INSTRUCTIONS
1.  Continue cleaning and use of topical antibiotic as needed.  2.  Make appointment with surgery at earliest convenience to discuss excision of the lesion.

## 2022-05-13 ENCOUNTER — OFFICE VISIT (OUTPATIENT)
Dept: FAMILY MEDICINE | Facility: CLINIC | Age: 22
End: 2022-05-13
Payer: COMMERCIAL

## 2022-05-13 VITALS
HEIGHT: 69 IN | HEART RATE: 64 BPM | DIASTOLIC BLOOD PRESSURE: 70 MMHG | SYSTOLIC BLOOD PRESSURE: 138 MMHG | WEIGHT: 259 LBS | BODY MASS INDEX: 38.36 KG/M2 | OXYGEN SATURATION: 97 % | RESPIRATION RATE: 14 BRPM | TEMPERATURE: 98.4 F

## 2022-05-13 DIAGNOSIS — L03.031 PARONYCHIA OF TOE, RIGHT: Primary | ICD-10-CM

## 2022-05-13 PROCEDURE — 99213 OFFICE O/P EST LOW 20 MIN: CPT | Performed by: NURSE PRACTITIONER

## 2022-05-13 ASSESSMENT — PAIN SCALES - GENERAL: PAINLEVEL: MODERATE PAIN (4)

## 2022-05-13 NOTE — PROGRESS NOTES
"  Assessment & Plan     Paronychia of toe, right  With ingrown toenail present.  At home care encouraged.  Discussed and provided with patient in AVS.  Follow-up if minimal improvement or if worsening.  Would consider an oral antibiotic if pain worsens and pus returns.               BMI:   Estimated body mass index is 38.25 kg/m  as calculated from the following:    Height as of this encounter: 1.753 m (5' 9\").    Weight as of this encounter: 117.5 kg (259 lb).           Return in about 2 weeks (around 5/27/2022) for ongoing symptoms if not improving.    oKri Barber, CAITIE CNP  [facility]    Annamarie Arredondo is a 22 year old who presents for the following health issues     History of Present Illness       Reason for visit:  Toe pain  Symptom onset:  3-7 days ago  Symptoms include:  Swollen red spot on toe  Symptom intensity:  Moderate  Symptom progression:  Staying the same  Had these symptoms before:  No    He eats 0-1 servings of fruits and vegetables daily.He consumes 2 sweetened beverage(s) daily.He exercises with enough effort to increase his heart rate 20 to 29 minutes per day.  He exercises with enough effort to increase his heart rate 4 days per week.   He is taking medications regularly.     Had discharge of pus yesterday.  Pain has improved since this occurred.  Overall symptoms have improved and is not as red and swollen.  He is wondering what he can do to help reduce symptoms.  He reports he has had ingrown toenail for years.  He denies any frequent history of regular infections.       Review of Systems   Constitutional, HEENT, cardiovascular, pulmonary, gi and gu systems are negative, except as otherwise noted.      Objective    /70 (BP Location: Right arm, Patient Position: Chair, Cuff Size: Adult Large)   Pulse 64   Temp 98.4  F (36.9  C) (Tympanic)   Resp 14   Ht 1.753 m (5' 9\")   Wt 117.5 kg (259 lb)   SpO2 97%   BMI 38.25 kg/m    Body mass index is 38.25 " kg/m .     Physical Exam   GENERAL: healthy, alert and no distress  SKIN: Right great toe ingrown toenail present bilateral lateral and medial aspect.  Slight fluctuance of the medial aspect of the right great toe with.  Some erythremia present.

## 2022-06-03 ENCOUNTER — OFFICE VISIT (OUTPATIENT)
Dept: URGENT CARE | Facility: URGENT CARE | Age: 22
End: 2022-06-03
Payer: COMMERCIAL

## 2022-06-03 VITALS
BODY MASS INDEX: 36.92 KG/M2 | WEIGHT: 250 LBS | DIASTOLIC BLOOD PRESSURE: 73 MMHG | HEART RATE: 65 BPM | OXYGEN SATURATION: 98 % | SYSTOLIC BLOOD PRESSURE: 120 MMHG | TEMPERATURE: 98.3 F

## 2022-06-03 DIAGNOSIS — S10.96XA TICK BITE OF NECK, INITIAL ENCOUNTER: Primary | ICD-10-CM
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
## 2022-06-03 PROCEDURE — 99203 OFFICE O/P NEW LOW 30 MIN: CPT | Performed by: PHYSICIAN ASSISTANT

## 2022-06-03 NOTE — PROGRESS NOTES
Assessment & Plan     1. Tick bite of neck, initial encounter  Not deer  - diphenhydrAMINE (BENADRYL) 25 MG tablet; Take 1 tablet (25 mg) by mouth nightly as needed (itching)  Dispense: 30 tablet; Refill: 0    Cool compresses. Topical anti-inflammatory. Diphenhydramine as needed. Follow up if not continuing to improve with time.                 GAVINO Aragon [facility] [place]    Annamarie Arredondo is a 22 year old male who presents to clinic today for the following health issues:  Chief Complaint   Patient presents with     Tick Bite     Left side neck tick bite noted 2 days ago. Redness and swelling noted. Denies fever, chills, body aches.      HPI    Tick bite noticed 2 days ago. Left side of neck with large tick noted. He does not think it is a deer tick. No pain. No discharge. Just red. Some itching.           Review of Systems        Objective    /73   Pulse 65   Temp 98.3  F (36.8  C)   Wt 113.4 kg (250 lb)   SpO2 98%   BMI 36.92 kg/m    Physical Exam  Neck:

## 2022-08-16 ENCOUNTER — HOSPITAL ENCOUNTER (EMERGENCY)
Facility: CLINIC | Age: 22
Discharge: HOME | End: 2022-08-16
Attending: NURSE PRACTITIONER | Admitting: NURSE PRACTITIONER
Payer: COMMERCIAL

## 2022-08-16 VITALS
WEIGHT: 245 LBS | DIASTOLIC BLOOD PRESSURE: 52 MMHG | RESPIRATION RATE: 16 BRPM | HEIGHT: 69 IN | BODY MASS INDEX: 36.29 KG/M2 | TEMPERATURE: 99.3 F | SYSTOLIC BLOOD PRESSURE: 143 MMHG | OXYGEN SATURATION: 99 % | HEART RATE: 72 BPM

## 2022-08-16 DIAGNOSIS — K64.9 HEMORRHOIDS, UNSPECIFIED HEMORRHOID TYPE: ICD-10-CM

## 2022-08-16 PROCEDURE — 99213 OFFICE O/P EST LOW 20 MIN: CPT | Performed by: NURSE PRACTITIONER

## 2022-08-16 PROCEDURE — G0463 HOSPITAL OUTPT CLINIC VISIT: HCPCS | Performed by: NURSE PRACTITIONER

## 2022-08-16 RX ORDER — HYDROCORTISONE 25 MG/G
CREAM TOPICAL
Qty: 30 G | Refills: 0 | Status: SHIPPED | OUTPATIENT
Start: 2022-08-16

## 2022-08-16 RX ORDER — HYDROCORTISONE ACETATE 25 MG/1
25 SUPPOSITORY RECTAL 2 TIMES DAILY PRN
Qty: 48 SUPPOSITORY | Refills: 0 | Status: SHIPPED | OUTPATIENT
Start: 2022-08-16 | End: 2022-08-16

## 2022-08-16 RX ORDER — DOCUSATE SODIUM 100 MG/1
200 CAPSULE, LIQUID FILLED ORAL 2 TIMES DAILY PRN
Qty: 100 CAPSULE | Refills: 0 | Status: SHIPPED | OUTPATIENT
Start: 2022-08-16

## 2022-08-16 NOTE — ED PROVIDER NOTES
"  History     Chief Complaint   Patient presents with     Hemorrhoids     HPI  Brendan Garay is a 22 year old male who presents with hemorrhoid concerns.  Pt states first noticed one week ago.  Pt reports pain with standing, walking, bending over, bowel movements.  Pt has treated with laxatives - miralax one capful 1-2 times 4 out of 7 days.  Pt states this has been helpful.  Pt bright red blood with wiping but denies bright red copious bleeding.  Pt denies syncope.  Pt denies hx of anemia.  Pt denies being seen for this previously.  Patient states he had hemorrhoid issues approximately 3 months ago and self treated.  He reports today his pain was intolerable while at work.  Patient also states that he likely has a \"poor diet \".  He states he drinks large volumes of water.    Allergies:  Allergies   Allergen Reactions     Seasonal Allergies        Problem List:    Patient Active Problem List    Diagnosis Date Noted     PTSD (post-traumatic stress disorder) 04/03/2018     Priority: Medium     Depression 02/27/2018     Priority: Medium     Childhood obesity 02/13/2012     Priority: Medium     Heart murmur 02/13/2012     Priority: Medium     ADHD (attention deficit hyperactivity disorder) 02/13/2012     Priority: Medium     Health Care Home 05/20/2013     Priority: Low     EMERGENCY CARE PLAN  May 20, 2013: No current Care Coordination follow up planned. Please refer if Care Coordination services are needed.    Presenting Problem Signs and Symptoms Treatment Plan   Questions or concerns   during clinic hours   I will call my clinic directly:  [facility]  [id number] [place] [id number] [phone].    Questions or concerns outside clinic hours   I will call the 24 hour nurse line at   [phone] or [facility].   Need to schedule an appointment   I will call the 24 hour scheduling team at [phone] or my clinic directly at [phone].    Same day treatment     I will call my clinic " first, nurse line if after hours, urgent care and express care if needed.   Clinic care coordination services (regular clinic hours)     I will call a clinic care coordinator directly:     Pramod Long RN  Steve Ryan, Fri - [phone]  Jasmyn Allen - [phone]    Jerri Laws, :    [phone]    Or call my clinic at [phone] and ask to speak with care coordination.   Crisis Services: Behavioral or Mental Health  Crisis Connection 24 Hour Phone Line  [phone]    [facility] 24 Hour Crisis Services  [phone]    [facility] ([facility] [phone]    [facility]   [phone]       Emergency treatment -- Immediately    CAll 911             Past Medical History:    Past Medical History:   Diagnosis Date     Depressive disorder        Past Surgical History:    Past Surgical History:   Procedure Laterality Date     NO HISTORY OF SURGERY         Family History:    Family History   Problem Relation Age of Onset     Substance Abuse Mother      Arthritis Father      Anxiety Disorder Father      Depression Father      Substance Abuse Father        Social History:  Marital Status:  Single [1]  Social History     Tobacco Use     Smoking status: Former Smoker     Packs/day: 0.50     Types: Cigarettes     Quit date: 2021     Years since quittin.6     Smokeless tobacco: Never Used     Tobacco comment: E-cig twice daily   Vaping Use     Vaping Use: Every day     Substances: Nicotine, Flavoring     Devices: Pre-filled pod   Substance Use Topics     Alcohol use: No     Alcohol/week: 0.0 standard drinks     Drug use: Yes     Types: Marijuana     Comment: 3/9/21 marijuana only        Medications:    docusate sodium (COLACE) 100 MG capsule  hydrocortisone, Perianal, (ANUSOL-HC) 2.5 % cream  diphenhydrAMINE (BENADRYL) 25 MG tablet  ibuprofen (ADVIL/MOTRIN) 600 MG tablet  loratadine (CLARITIN) 10 MG tablet  Multiple Vitamin (MULTI-VITAMIN PO)  tretinoin (RETIN-A) 0.025 %  "cream          Review of Systems  As mentioned above in the history present illness. All other systems were reviewed and are negative.    Physical Exam   BP: (!) 143/52  Pulse: 72  Temp: 99.3  F (37.4  C)  Resp: 16  Height: 175.3 cm (5' 9\")  Weight: 111.1 kg (245 lb)  SpO2: 99 %      Physical Exam  BP (!) 143/52   Pulse 72   Temp 99.3  F (37.4  C) (Tympanic)   Resp 16   Ht 1.753 m (5' 9\")   Wt 111.1 kg (245 lb)   SpO2 99%   BMI 36.18 kg/m    General: alert and in no acute distress  Head: atraumatic, normocephalic  Abd: nondistended  Musculoskel/Extremities: normal extremities, no apparent edema, and full AROM of major joints  Skin: no rashes, no diaphoresis and skin color normal  Neuro: Patient awake, alert, oriented, speech is fluent, gait is normal  Psychiatric: affect/mood normal, cooperative, normal judgement/insight and memory intact      ED Course                 Procedures      No results found for this or any previous visit (from the past 24 hour(s)).    Medications - No data to display    Assessments & Plan (with Medical Decision Making)     I have reviewed the nursing notes.    I have reviewed the findings, diagnosis, plan and need for follow up with the patient.  22-year-old male presenting to urgent care to discuss hemorrhoid concerns and denies any recent rectal trauma and bright red rectal bleeding and copious amounts.  Patient states a few days ago he had bright red bleeding with wiping but it has resolved presently.  Patient states increasing pain today when working and wonders how best to treat his hemorrhoids.  Patient declined rectal exam today spent 15 minutes of face-to-face time discussing with patient care of hemorrhoids and treatments including diet, exercise, fluid intake, medication options and finally surgical option with a general surgeon.  Patient verbalized understanding regarding all of the above.  Will initiate with a hemorrhoid suppository if paid for otherwise a hemorrhoid " cream and stool softeners and note provided for work.  Patient discharged in stable condition.  Discharge Medication List as of 8/16/2022  6:00 PM      START taking these medications    Details   docusate sodium (COLACE) 100 MG capsule Take 2 capsules (200 mg) by mouth 2 times daily as needed for constipation, Disp-100 capsule, R-0, E-Prescribe      hydrocortisone (ANUSOL-HC) 25 MG suppository Place 1 suppository (25 mg) rectally 2 times daily as needed for hemorrhoids, Disp-48 suppository, R-0, E-Prescribe             Final diagnoses:   Hemorrhoids, unspecified hemorrhoid type       8/16/2022   [facility] EMERGENCY DEPT     Paulette Pinzon, CAITIE MCADAMS  08/16/22 2000

## 2022-08-16 NOTE — Clinical Note
Brendan Garay was seen and treated in our emergency department on 8/16/2022.  He may return to work on 08/22/2022.  Please excuse patient due to illness.     If you have any questions or concerns, please don't hesitate to call.      Paulette Pinzon APRN CNP

## 2022-08-16 NOTE — DISCHARGE INSTRUCTIONS
Sure that you are drinking at least 64 fluid ounces of water daily.  Start Colace and take 2 capsules by mouth twice daily until having regular soft bowel movements once or twice daily.  Decrease these capsules to maintain 1-2 bowel movements daily.  For the hemorrhoids I recommend Anusol suppositories twice daily as needed.  Follow-up with primary care if no improvement or request surgical evaluation if desire hemorrhoidectomy.

## 2022-10-04 ENCOUNTER — HOSPITAL ENCOUNTER (EMERGENCY)
Facility: CLINIC | Age: 22
Discharge: HOME | End: 2022-10-04
Attending: PHYSICIAN ASSISTANT | Admitting: PHYSICIAN ASSISTANT
Payer: COMMERCIAL

## 2022-10-04 VITALS
RESPIRATION RATE: 16 BRPM | DIASTOLIC BLOOD PRESSURE: 60 MMHG | OXYGEN SATURATION: 97 % | SYSTOLIC BLOOD PRESSURE: 133 MMHG | TEMPERATURE: 99 F | HEART RATE: 58 BPM

## 2022-10-04 DIAGNOSIS — J06.9 VIRAL URI WITH COUGH: ICD-10-CM

## 2022-10-04 LAB
FLUAV RNA SPEC QL NAA+PROBE: NEGATIVE
FLUBV RNA RESP QL NAA+PROBE: NEGATIVE
SARS-COV-2 RNA RESP QL NAA+PROBE: NEGATIVE

## 2022-10-04 PROCEDURE — 87636 SARSCOV2 & INF A&B AMP PRB: CPT | Performed by: PHYSICIAN ASSISTANT

## 2022-10-04 PROCEDURE — G0463 HOSPITAL OUTPT CLINIC VISIT: HCPCS | Mod: CS | Performed by: PHYSICIAN ASSISTANT

## 2022-10-04 PROCEDURE — 99213 OFFICE O/P EST LOW 20 MIN: CPT | Mod: CS | Performed by: PHYSICIAN ASSISTANT

## 2022-10-04 PROCEDURE — C9803 HOPD COVID-19 SPEC COLLECT: HCPCS | Performed by: PHYSICIAN ASSISTANT

## 2022-10-04 NOTE — ED PROVIDER NOTES
History   No chief complaint on file.    HPI  Brendan Garay is a 22 year old male who presents the urgent care with concern over 3-day history of illness.  Patient complains of sore throat, nasal congestion, cough, chills, myalgias.  She has not had any objective fever, dyspnea, wheezing, vomiting, diarrhea or abdominal complaints.  He has had household contacts with similar symptoms.  He has attempted to treat with OTC medications without relief.      Allergies:  Allergies   Allergen Reactions     Seasonal Allergies      Problem List:    Patient Active Problem List    Diagnosis Date Noted     PTSD (post-traumatic stress disorder) 04/03/2018     Priority: Medium     Depression 02/27/2018     Priority: Medium     Childhood obesity 02/13/2012     Priority: Medium     Heart murmur 02/13/2012     Priority: Medium     ADHD (attention deficit hyperactivity disorder) 02/13/2012     Priority: Medium     Health Care Home 05/20/2013     Priority: Low     EMERGENCY CARE PLAN  May 20, 2013: No current Care Coordination follow up planned. Please refer if Care Coordination services are needed.    Presenting Problem Signs and Symptoms Treatment Plan   Questions or concerns   during clinic hours   I will call my clinic directly:  [address] [address]  [phone].    Questions or concerns outside clinic hours   I will call the 24 hour nurse line at   [phone] or [facility].   Need to schedule an appointment   I will call the 24 hour scheduling team at [phone] or my clinic directly at [phone].    Same day treatment     I will call my clinic first, nurse line if after hours, urgent care and express care if needed.   Clinic care coordination services (regular clinic hours)     I will call a clinic care coordinator directly:     Pramod Long RN  Mon, Tues, Fri - [phone]  Wed, Thurs - [phone]    Jerri Laws :    [phone]    Or call my clinic at  [phone] and ask to speak with care coordination.   Crisis Services: Behavioral or Mental Health  Crisis Connection 24 Hour Phone Line  [phone]    [facility] 24 Hour Crisis Services  [phone]    [facility] ([facility]) Network [phone]    [facility]   [phone]       Emergency treatment -- Immediately    CAll 911             Past Medical History:    Past Medical History:   Diagnosis Date     Depressive disorder        Past Surgical History:    Past Surgical History:   Procedure Laterality Date     NO HISTORY OF SURGERY         Family History:    Family History   Problem Relation Age of Onset     Substance Abuse Mother      Arthritis Father      Anxiety Disorder Father      Depression Father      Substance Abuse Father        Social History:  Marital Status:  Single [1]  Social History     Tobacco Use     Smoking status: Former Smoker     Packs/day: 0.50     Types: Cigarettes     Quit date: 2021     Years since quittin.7     Smokeless tobacco: Never Used     Tobacco comment: E-cig twice daily   Vaping Use     Vaping Use: Every day     Substances: Nicotine, Flavoring     Devices: Pre-filled pod   Substance Use Topics     Alcohol use: No     Alcohol/week: 0.0 standard drinks     Drug use: Yes     Types: Marijuana     Comment: 3/9/21 marijuana only        Medications:    diphenhydrAMINE (BENADRYL) 25 MG tablet  docusate sodium (COLACE) 100 MG capsule  hydrocortisone, Perianal, (ANUSOL-HC) 2.5 % cream  ibuprofen (ADVIL/MOTRIN) 600 MG tablet  loratadine (CLARITIN) 10 MG tablet  Multiple Vitamin (MULTI-VITAMIN PO)  tretinoin (RETIN-A) 0.025 % cream      Review of Systems  CONSTITUTIONAL:POSITIVE  for chills, myalgias and NEGATIVE  for fever   INTEGUMENTARY/SKIN: NEGATIVE for worrisome rashes, moles or lesions  EYES: NEGATIVE for vision changes or irritation  ENT/MOUTH: POSITIVE for sore throat, nasal congestion and NEGATIVE for ear pain   RESP:POSITIVE for cough  and NEGATIVE for SOB/dyspnea and wheezing  GI: NEGATIVE for abdominal pain, diarrhea, nausea and vomiting  Physical Exam     /60 (BP Location: Left arm, Patient Position: Sitting, Cuff Size: Adult Regular)   Pulse 58   Temp 99  F (37.2  C) (Oral)   Resp 16   SpO2 97%     Physical Exam  GENERAL APPEARANCE: healthy, alert and no distress  EYES: EOMI,  PERRL, conjunctiva clear  HENT: ear canals and TM's normal.  Nose and mouth without ulcers, erythema or lesions  NECK: supple, nontender, no lymphadenopathy  RESP: lungs clear to auscultation - no rales, rhonchi or wheezes  CV: regular rates and rhythm, normal S1 S2, no murmur noted  SKIN: no suspicious lesions or rashes  ED Course           Procedures       Critical Care time:  none        Results for orders placed or performed during the hospital encounter of 10/04/22   Symptomatic; Auto-generated order Influenza A/B & SARS-CoV2 (COVID-19) Virus PCR Multiplex Nasopharyngeal     Status: Normal    Specimen: Nasopharyngeal; Swab   Result Value Ref Range    Influenza A PCR Negative Negative    Influenza B PCR Negative Negative    SARS CoV2 PCR Negative Negative    Narrative    Testing was performed using the ming SARS-CoV-2 & Influenza A/B Assay on the ming Mally System. This test should be ordered for the detection of SARS-CoV-2 and influenza viruses in individuals who meet clinical and/or epidemiological criteria. Test performance is unknown in asymptomatic patients. This test is for in vitro diagnostic use under the FDA EUA for laboratories certified under CLIA to perform moderate and/or high complexity testing. This test has not been FDA cleared or approved. A negative result does not rule out the presence of PCR inhibitors in the specimen or target RNA in concentration below the limit of detection for the assay. If only one viral target is positive but coinfection with multiple targets is suspected, the sample should be re-tested with another FDA cleared,  approved or authorized test, if coinfection would change clinical management. [facility] are certified under the Clinical Laboratory Improvement Amendments of 1988 (CLIA-88) as  qualified to perform moderate and/or high complexity laboratory testing.     Medications - No data to display    Assessments & Plan (with Medical Decision Making)     I have reviewed the nursing notes.    I have reviewed the findings, diagnosis, plan and need for follow up with the patient.       New Prescriptions    No medications on file     Final diagnoses:   Viral URI with cough     22 year old male presents to the urgent care with concern over 3-day history of sore throat, nasal congestion, cough.  He had stable vital signs upon arrival.  Physical exam findings were benign.  As part of evaluation he did have negative rapid strep test with PCR testing pending at time of discharge.  I have low suspicion for strep throat however given presence of throat pain did discuss risk/benefits of testing and patient agreed to defer.  She was discharged home stable with instructions for symptomatic treatment.  Follow-up with primary care provider if no improvement within 7 to 10 days.  Worrisome reasons to return to the ER/UC sooner discussed.    Disclaimer: This note consists of symbols derived from keyboarding, dictation, and/or voice recognition software. As a result, there may be errors in the script that have gone undetected.  Please consider this when interpreting information found in the chart.      10/4/2022   [facility] EMERGENCY DEPT     Sammi Reynolds PA-C  10/06/22 1300

## 2022-10-04 NOTE — Clinical Note
Brendan Garay was seen and treated in our emergency department on 10/4/2022.    He may return to full activity as tolerated by symptoms as long as his testing for COVID-19 is negative and he is not running a fever.  If COVID testing is positive he needs to isolate at home for 5 days from the onset of symptoms and until he is afebrile.  May return to work after that as long as he continues to wear mask for additional 5 days.       Sincerely,     [facility] Emergency Dept

## 2022-10-13 ENCOUNTER — HOSPITAL ENCOUNTER (EMERGENCY)
Facility: CLINIC | Age: 22
Discharge: HOME | End: 2022-10-13
Attending: PHYSICIAN ASSISTANT | Admitting: PHYSICIAN ASSISTANT
Payer: COMMERCIAL

## 2022-10-13 VITALS
SYSTOLIC BLOOD PRESSURE: 145 MMHG | WEIGHT: 245 LBS | DIASTOLIC BLOOD PRESSURE: 82 MMHG | TEMPERATURE: 98.1 F | OXYGEN SATURATION: 100 % | HEART RATE: 72 BPM | HEIGHT: 69 IN | RESPIRATION RATE: 16 BRPM | BODY MASS INDEX: 36.29 KG/M2

## 2022-10-13 DIAGNOSIS — J01.90 ACUTE SINUSITIS WITH SYMPTOMS > 10 DAYS: ICD-10-CM

## 2022-10-13 PROCEDURE — 99213 OFFICE O/P EST LOW 20 MIN: CPT | Performed by: PHYSICIAN ASSISTANT

## 2022-10-13 PROCEDURE — G0463 HOSPITAL OUTPT CLINIC VISIT: HCPCS | Performed by: PHYSICIAN ASSISTANT

## 2022-10-13 ASSESSMENT — ACTIVITIES OF DAILY LIVING (ADL): ADLS_ACUITY_SCORE: 35

## 2022-10-13 ASSESSMENT — ENCOUNTER SYMPTOMS
CONSTITUTIONAL NEGATIVE: 1
RHINORRHEA: 1
SINUS PAIN: 1
SINUS PRESSURE: 1
FEVER: 0

## 2022-10-13 NOTE — LETTER
October 13, 2022      To Whom It May Concern:      Brendan MICHEL Jrarod was seen in our Emergency Department today, 10/13/22.  Please excuse from work the past two days.  Thank you.      Sincerely,        Munira Melgar PA-C

## 2022-10-14 NOTE — ED PROVIDER NOTES
History     Chief Complaint   Patient presents with     Facial Pain     Patient reports he thinks he has a sinus infection. Several covid negative tests at home.     HPI  Brendan Garay is a 22 year old male who presents with complaints of a possible sinus infection.  Patient states he has had facial pain, sore throat, nasal congestion, sinus pain and pressure, and cough for the past 10 days.  Patient was evaluated in the urgent care 9 days ago with negative COVID-19 testing at that time.  Symptoms have persisted since that time.  Denies fevers, chills, nausea, vomiting, diarrhea, abdominal pain, chest pain, or shortness of breath.      Allergies:  Allergies   Allergen Reactions     Seasonal Allergies        Problem List:    Patient Active Problem List    Diagnosis Date Noted     PTSD (post-traumatic stress disorder) 04/03/2018     Priority: Medium     Depression 02/27/2018     Priority: Medium     Childhood obesity 02/13/2012     Priority: Medium     Heart murmur 02/13/2012     Priority: Medium     ADHD (attention deficit hyperactivity disorder) 02/13/2012     Priority: Medium     Health Care Home 05/20/2013     Priority: Low     EMERGENCY CARE PLAN  May 20, 2013: No current Care Coordination follow up planned. Please refer if Care Coordination services are needed.    Presenting Problem Signs and Symptoms Treatment Plan   Questions or concerns   during clinic hours   I will call my clinic directly:  [address] [id number] [phone].    Questions or concerns outside clinic hours   I will call the 24 hour nurse line at   [phone] or [facility].   Need to schedule an appointment   I will call the 24 hour scheduling team at [phone] or my clinic directly at [phone].    Same day treatment     I will call my clinic first, nurse line if after hours, urgent care and express care if needed.   Clinic care coordination services (regular clinic hours)     I will  call a clinic care coordinator directly:     Pramod Long RN  Steve Ryan Fri - [phone]  Jasmyn Allen - [phone]    Jerri Laws :    [phone]    Or call my clinic at [phone] and ask to speak with care coordination.   Crisis Services: Behavioral or Mental Health  [facility] 24 Hour Phone Line  [phone]    [facility] 24 Hour Crisis Services  [phone]    [facility] ([facility] Providers) Network [phone]    [facility]   [phone]       Emergency treatment -- Immediately    [facility] 911             Past Medical History:    Past Medical History:   Diagnosis Date     Depressive disorder        Past Surgical History:    Past Surgical History:   Procedure Laterality Date     NO HISTORY OF SURGERY         Family History:    Family History   Problem Relation Age of Onset     Substance Abuse Mother      Arthritis Father      Anxiety Disorder Father      Depression Father      Substance Abuse Father        Social History:  Marital Status:  Single [1]  Social History     Tobacco Use     Smoking status: Former     Packs/day: 0.50     Types: Cigarettes     Quit date: 2021     Years since quittin.7     Smokeless tobacco: Never     Tobacco comments:     E-cig twice daily   Vaping Use     Vaping Use: Every day     Substances: Nicotine, Flavoring     Devices: Pre-filled pod   Substance Use Topics     Alcohol use: No     Alcohol/week: 0.0 standard drinks     Drug use: Yes     Types: Marijuana     Comment: 3/9/21 marijuana only        Medications:    amoxicillin-clavulanate (AUGMENTIN) 875-125 MG tablet  diphenhydrAMINE (BENADRYL) 25 MG tablet  docusate sodium (COLACE) 100 MG capsule  hydrocortisone, Perianal, (ANUSOL-HC) 2.5 % cream  ibuprofen (ADVIL/MOTRIN) 600 MG tablet  loratadine (CLARITIN) 10 MG tablet  Multiple Vitamin (MULTI-VITAMIN PO)  tretinoin (RETIN-A) 0.025 % cream          Review of Systems   Constitutional: Negative.  Negative for fever.   HENT:  "Positive for congestion, postnasal drip, rhinorrhea, sinus pressure and sinus pain.    All other systems reviewed and are negative.      Physical Exam   BP: (!) 145/82  Pulse: 72  Temp: 98.1  F (36.7  C)  Resp: 16  Height: 175.3 cm (5' 9\")  Weight: 111.1 kg (245 lb)  SpO2: 100 %      Physical Exam  Constitutional:       General: He is not in acute distress.     Appearance: Normal appearance. He is well-developed and well-nourished. He is not ill-appearing, toxic-appearing or diaphoretic.   HENT:      Head: Normocephalic and atraumatic.      Right Ear: Tympanic membrane, ear canal and external ear normal.      Left Ear: Tympanic membrane, ear canal and external ear normal.      Nose: Congestion and rhinorrhea present.      Right Sinus: Maxillary sinus tenderness present.      Left Sinus: Maxillary sinus tenderness present.      Mouth/Throat:      Lips: Pink.      Mouth: Mucous membranes are normal. Mucous membranes are moist.      Pharynx: Uvula midline. Posterior oropharyngeal erythema present. No pharyngeal swelling, oropharyngeal exudate, posterior oropharyngeal edema or uvula swelling.      Tonsils: No tonsillar exudate or tonsillar abscesses.   Eyes:      Extraocular Movements: Extraocular movements intact and EOM normal.      Conjunctiva/sclera: Conjunctivae normal.      Pupils: Pupils are equal, round, and reactive to light.   Cardiovascular:      Rate and Rhythm: Normal rate and regular rhythm.      Heart sounds: Normal heart sounds.   Pulmonary:      Effort: Pulmonary effort is normal. No respiratory distress.      Breath sounds: Normal breath sounds. No stridor. No wheezing, rhonchi or rales.   Musculoskeletal:         General: Normal range of motion.      Cervical back: Full passive range of motion without pain, normal range of motion and neck supple. No rigidity. Normal range of motion.   Lymphadenopathy:      Cervical: No cervical adenopathy.   Skin:     General: Skin is warm and dry.   Neurological:     "  Mental Status: He is alert and oriented to person, place, and time.   Psychiatric:         Behavior: Behavior is cooperative.         ED Course                 Procedures    No results found for this or any previous visit (from the past 24 hour(s)).    Medications - No data to display    Assessments & Plan (with Medical Decision Making)     Pt is a 22 year old male who presents with complaints of a possible sinus infection.  Patient states he has had facial pain, sore throat, nasal congestion, sinus pain and pressure, and cough for the past 10 days.  Patient was evaluated in the urgent care 9 days ago with negative COVID-19 testing at that time.  Symptoms have persisted since that time.      Pt is afebrile on arrival.  Exam as above.  Given prolonged symptoms, will treat with antibiotics.  Encouraged symptomatic treatments at home.  Return precautions were reviewed.  Hand-outs were provided.    Patient was sent with Augmentin and was instructed to follow-up with PCP in 3-5 days for continued care and management or sooner if new or worsening symptoms.  He is to return to the ED for persistent and/or worsening symptoms.  Patient expressed understanding of the diagnosis and plan and was discharged home in good condition.    I have reviewed the nursing notes.    I have reviewed the findings, diagnosis, plan and need for follow up with the patient.    Discharge Medication List as of 10/13/2022  7:04 PM      START taking these medications    Details   amoxicillin-clavulanate (AUGMENTIN) 875-125 MG tablet Take 1 tablet by mouth 2 times daily for 7 days, Disp-14 tablet, R-0, E-Prescribe             Final diagnoses:   Acute sinusitis with symptoms > 10 days       10/13/2022   [facility] EMERGENCY DEPT      Disclaimer:  This note consists of symbols derived from keyboarding, dictation and/or voice recognition software.  As a result, there may be errors in the script that have gone undetected.  Please consider  this when interpreting information found in this chart.     Munira Melgar PA-C  10/13/22 2004

## 2022-11-03 ENCOUNTER — E-VISIT (OUTPATIENT)
Dept: FAMILY MEDICINE | Facility: CLINIC | Age: 22
End: 2022-11-03
Payer: COMMERCIAL

## 2022-11-03 DIAGNOSIS — L02.818 CUTANEOUS ABSCESS OF OTHER SITE: Primary | ICD-10-CM

## 2022-11-03 PROCEDURE — 99421 OL DIG E/M SVC 5-10 MIN: CPT | Performed by: NURSE PRACTITIONER

## 2022-11-03 RX ORDER — SULFAMETHOXAZOLE/TRIMETHOPRIM 800-160 MG
1 TABLET ORAL 2 TIMES DAILY
Qty: 14 TABLET | Refills: 0 | Status: SHIPPED | OUTPATIENT
Start: 2022-11-03 | End: 2022-11-10

## 2022-11-03 NOTE — PATIENT INSTRUCTIONS
Thank you for choosing us for your care. I have placed an order for a prescription so that you can start treatment. View your full visit summary for details by clicking on the link below. Your pharmacist will able to address any questions you may have about the medication.     If you're not feeling better within 5-7 days, please schedule an appointment.  You can schedule an appointment right here in [facility], or call [phone]  If the visit is for the same symptoms as your eVisit, we'll refund the cost of your eVisit if seen within seven days.

## 2022-11-20 ENCOUNTER — HEALTH MAINTENANCE LETTER (OUTPATIENT)
Age: 22
End: 2022-11-20

## 2022-12-12 ENCOUNTER — E-VISIT (OUTPATIENT)
Dept: URGENT CARE | Facility: CLINIC | Age: 22
End: 2022-12-12
Payer: COMMERCIAL

## 2022-12-12 DIAGNOSIS — A05.9 FOOD POISONING: Primary | ICD-10-CM

## 2022-12-12 PROCEDURE — 99421 OL DIG E/M SVC 5-10 MIN: CPT | Performed by: PHYSICIAN ASSISTANT

## 2022-12-20 ENCOUNTER — ANCILLARY PROCEDURE (OUTPATIENT)
Dept: GENERAL RADIOLOGY | Facility: CLINIC | Age: 22
End: 2022-12-20
Attending: PHYSICIAN ASSISTANT
Payer: COMMERCIAL

## 2022-12-20 ENCOUNTER — OFFICE VISIT (OUTPATIENT)
Dept: URGENT CARE | Facility: URGENT CARE | Age: 22
End: 2022-12-20
Payer: COMMERCIAL

## 2022-12-20 VITALS
TEMPERATURE: 97.5 F | BODY MASS INDEX: 37.95 KG/M2 | DIASTOLIC BLOOD PRESSURE: 70 MMHG | WEIGHT: 257 LBS | OXYGEN SATURATION: 98 % | HEART RATE: 67 BPM | SYSTOLIC BLOOD PRESSURE: 122 MMHG

## 2022-12-20 DIAGNOSIS — M25.532 LEFT WRIST PAIN: Primary | ICD-10-CM

## 2022-12-20 DIAGNOSIS — M70.832: ICD-10-CM
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
## 2022-12-20 PROCEDURE — 99213 OFFICE O/P EST LOW 20 MIN: CPT | Performed by: PHYSICIAN ASSISTANT

## 2022-12-20 PROCEDURE — 73110 X-RAY EXAM OF WRIST: CPT | Mod: TC | Performed by: RADIOLOGY

## 2022-12-20 RX ORDER — IBUPROFEN 800 MG/1
800 TABLET, FILM COATED ORAL EVERY 8 HOURS PRN
Qty: 30 TABLET | Refills: 0 | Status: SHIPPED | OUTPATIENT
Start: 2022-12-20

## 2022-12-20 ASSESSMENT — ENCOUNTER SYMPTOMS: ARTHRALGIAS: 1

## 2022-12-21 NOTE — PROGRESS NOTES
SUBJECTIVE:   Brendan Garay is a 22 year old male presenting with a chief complaint of   Chief Complaint   Patient presents with     Wrist Pain     Left wrist pain started couple days ago. Says can't put any weight on it.        He is an established patient of [facility].  Patient presents with left wrist pain for a few days.  No trauma.  No known wrist problems.  RHD.  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  He's been doing the job for about a year.  No tingling.     Treatment:  Ibuprofen 4 pills total; utilizing an ace bandage.      Review of Systems   Musculoskeletal: Positive for arthralgias.   All other systems reviewed and are negative.      Past Medical History:   Diagnosis Date     Depressive disorder      Family History   Problem Relation Age of Onset     Substance Abuse Mother      Arthritis Father      Anxiety Disorder Father      Depression Father      Substance Abuse Father      Current Outpatient Medications   Medication Sig Dispense Refill     bismuth subsalicylate (PEPTO-BISMOL MAX STRENGTH) 525 MG/15ML Take 30 mLs by mouth every 2 hours as needed (diarrhea) 236 mL 3     diphenhydrAMINE (BENADRYL) 25 MG tablet Take 1 tablet (25 mg) by mouth nightly as needed (itching) 30 tablet 0     docusate sodium (COLACE) 100 MG capsule Take 2 capsules (200 mg) by mouth 2 times daily as needed for constipation 100 capsule 0     hydrocortisone, Perianal, (ANUSOL-HC) 2.5 % cream Apply to rectum bid x10 days 30 g 0     ibuprofen (ADVIL/MOTRIN) 600 MG tablet Take 1 tablet (600 mg) by mouth every 6 hours as needed for moderate pain 30 tablet 1     ibuprofen (ADVIL/MOTRIN) 800 MG tablet Take 1 tablet (800 mg) by mouth every 8 hours as needed for moderate pain (4-6) 30 tablet 0     loratadine (CLARITIN) 10 MG tablet Take 1 tablet (10 mg) by mouth daily 90 tablet 1     Multiple Vitamin (MULTI-VITAMIN PO) Take 1 tablet by mouth  daily.       tretinoin (RETIN-A) 0.025 % cream Spread a pea size amount into affected area topically at bedtime.  Use sunscreen SPF>20. 45 g 11     Social History     Tobacco Use     Smoking status: Former     Packs/day: 0.50     Types: Cigarettes     Quit date: 2021     Years since quittin.9     Smokeless tobacco: Never     Tobacco comments:     E-cig twice daily   Substance Use Topics     Alcohol use: No     Alcohol/week: 0.0 standard drinks       OBJECTIVE  /70   Pulse 67   Temp 97.5  F (36.4  C) (Tympanic)   Wt 116.6 kg (257 lb)   SpO2 98%   BMI 37.95 kg/m      Physical Exam  Vitals and nursing note reviewed.   Constitutional:       Appearance: Normal appearance. He is obese.   Eyes:      Extraocular Movements: Extraocular movements intact.      Conjunctiva/sclera: Conjunctivae normal.   Cardiovascular:      Rate and Rhythm: Normal rate.   Musculoskeletal:         General: Tenderness present. Normal range of motion.      Comments: Left wrist:  normal ROM.  Negative snuff box.  +radial pulse.  Negative tinel's, negative phalen, negative finkelstein, good cap refill.  Tenderness to palpation of volar of wrist.     Neurological:      General: No focal deficit present.      Mental Status: He is alert.   Psychiatric:         Mood and Affect: Mood normal.         Labs:  Results for orders placed or performed in visit on 22 (from the past 24 hour(s))   XR Wrist Left G/E 3 Views    Narrative    EXAM: XR WRIST LEFT G/E 3 VIEWS  LOCATION: [facility]  DATE/TIME: 2022 7:05 PM    INDICATION:  Left wrist pain  COMPARISON: None.      Impression    IMPRESSION: Normal joint spaces and alignment. No fracture.       X-Ray was done, my findings are: NAD    ASSESSMENT:      ICD-10-CM    1. Left wrist pain  M25.532 XR Wrist Left G/E 3 Views     Wrist/Arm/Hand Supplies Order for DME - ONLY FOR DME     ibuprofen (ADVIL/MOTRIN) 800 MG tablet      2. Repetitive motion disease  of wrist, left  M70.832     X50.3XXA            Medical Decision Making:    Differential Diagnosis:  MS Injury Pain: OA, overuse syndrome, carpal tunnel, deQuervain tenosynovitis     Serious Comorbid Conditions:  Adult:  reviewed    PLAN:    Patient placed in cockup splint.  Rx for ibuprofen 800 TID.  Patient education.  Discussed possible other ways of performing his job.  Discussed repetitive stress syndrome.      Followup:    If not improving or if condition worsens, follow up with your Primary Care Provider, If not improving or if conditions worsens over the next 12-24 hours, go to the Emergency Department    There are no Patient Instructions on file for this visit.

## 2022-12-24 ENCOUNTER — NURSE TRIAGE (OUTPATIENT)
Dept: NURSING | Facility: CLINIC | Age: 22
End: 2022-12-24

## 2022-12-24 NOTE — TELEPHONE ENCOUNTER
"Coronavirus (COVID-19) Notification    Caller Name (Patient, parent, daughter/son, grandparent, etc)  Arnulfo    Reason for call  Notify of Positive Coronavirus (COVID-19) lab results, assess symptoms,  review  [facility] recommendations    Lab Result    Lab test:  2019-nCoV rRt-PCR or SARS-CoV-2 PCR    Oropharyngeal AND/OR nasopharyngeal swabs is POSITIVE for 2019-nCoV RNA/SARS-COV-2 PCR (COVID-19 virus)    Brief introduction script  Introduce self then review script:  \"I am calling on behalf of [employer].  We were notified that your Coronavirus test (COVID-19) for was POSITIVE for the virus.\"    Gather patient reported symptoms   Assessment   Current Symptoms at time of phone call, reported by patient: (if no symptoms, document No symptoms] Body aches, headache, chills, nasal congestion, shortness of breath and chest pressure (constant)   Date of Symptom(s) onset (if applicable) Today     If at time of call, Patients symptoms hare worsened, the Patient should contact 911 or have someone drive them to Emergency Dept promptly:      If Patient calling 911, inform 911 personal that you have tested positive for the Coronavirus (COVID-19).  Place mask on and await 911 to arrive.    If Emergency Dept, If possible, please have another adult drive you to the Emergency Dept but you need to wear mask when in contact with other people.      Monoclonal Antibody Administration    You may be eligible to receive a new treatment with a monoclonal antibody for preventing hospitalization in patients at high risk for complications from COVID-19. This medication is still experimental and available on a limited basis; it is given through an IV and must be given at an infusion center. Please note that not all people who are eligible will receive the medication since it is in limited supply.  Is the patient symptomatic and onset of symptoms within the last 7 days?  Yes  Is the patient interested in a visit with a provider to " discuss treatment options?: No.  Reason patient declined:  Other: Pt says he is experiencing constant chest pressure and shortness of breath. ER evaluation recommended.    Review information with Patient    Your result was positive. This means you have COVID-19 (coronavirus).      How can I protect others?    These guidelines are for isolating before returning to work, school or .       If you DO have symptoms:  o Stay home and away from others  - For at least 5 days after your symptoms started, AND   - You are fever free for 24 hours (with no medicine that reduces fever), AND  - Your other symptoms are better.  o Wear a mask for 10 full days any time you are around others.    If you DON'T have symptoms:  o Stay at home and away from others for at least 5 days after your positive test.  o Wear a mask for 10 full days any time you are around others.    There may be different guidelines for healthcare facilities. Please check with the specific sites before arriving.     If you've been told by a doctor that you were severely ill with COVID-19 or are immunocompromised, you should isolate for at least 10 days.    You should not go back to work until you meet the guidelines above for ending your home isolation. You don't need to be retested for COVID-19 before going back to work--studies show that you won't spread the virus if it's been at least 10 days since your symptoms started (or 20 days, if you have a weak immune system).    Employers, schools, and daycares: This is an official notice for this person's medical guidelines for returning in-person. They must meet the above guidelines before going back to work, school, or  in person.    You will receive a positive COVID-19 letter via [employer] or the mail soon with additional self-care information.      Would you like me to review some of that information with you now?  No    If you were tested for an upcoming procedure, please contact your provider for next  steps.     Rosemarie Silverman RN    Reason for Disposition    SEVERE or constant chest pain or pressure  (Exception: Mild central chest pain, present only when coughing.)    Additional Information    Negative: [1] Diagnosed or suspected COVID-19 AND [2] symptoms lasting 3 or more weeks    Negative: [1] COVID-19 exposure AND [2] no symptoms    Negative: COVID-19 vaccine reaction suspected (e.g., fever, headache, muscle aches) occurring 1 to 3 days after getting vaccine    Negative: COVID-19 vaccine, questions about    Negative: [1] Lives with someone known to have influenza (flu test positive) AND [2] flu-like symptoms (e.g., cough, runny nose, sore throat, SOB; with or without fever)    Negative: [1] Adult with possible COVID-19 symptoms AND [2] triager concerned about severity of symptoms or other causes    Negative: COVID-19 and breastfeeding, questions about    Protocols used: CORONAVIRUS (COVID-19) DIAGNOSED OR [id number] 1.18.2022

## 2023-01-09 ENCOUNTER — E-VISIT (OUTPATIENT)
Dept: URGENT CARE | Facility: CLINIC | Age: 23
End: 2023-01-09

## 2023-01-09 DIAGNOSIS — R19.7 DIARRHEA, UNSPECIFIED TYPE: Primary | ICD-10-CM

## 2023-01-09 PROCEDURE — 99207 PR NON-BILLABLE SERV PER CHARTING: CPT | Performed by: FAMILY MEDICINE

## 2023-01-10 NOTE — PATIENT INSTRUCTIONS
Dear Brendan Garay,    We are sorry you are not feeling well. Based on the responses you provided, it is recommended that you be seen in-person in urgent care so we can better evaluate your symptoms. Please click here to find the nearest urgent care location to you.   You will not be charged for this Visit. Thank you for trusting us with your care.    Shelley Keane MD

## 2023-07-18 NOTE — PROGRESS NOTES
Visit Date:   05/09/2018      COUNSELOR TRANSITION SUMMARY      PROGRAM NAME:  [facility].      ADMISSION DATE:  02/27/2018      TRANSITION DATE:  05/9/2018      DATE LAST SEEN:  05/09/2018      TOTAL NUMBER OF DAYS: 46     TRANSITION STATUS:  Brendan is transitioning to Phase II Aftercare Services at [facility].      Brendan Hoffman, a 18-year-old adolescent male, was recommended to complete the [facility] Program by Dominick Rae [facility], [facility].   Brendan saw Dominick for a dual assessment on 02/15/2018 after being recently discharged from [facility] due to relapsing on marijuana as well as having a verbal altercation with a peer who he was not getting along with the program.  It was recommended that he complete a dual outpatient program.  Therefore, he came for the assessment and based on the information that Brendan and his father provided during the dual assessment it was recommended he participate in the dual intensive outpatient treatment program at [facility] in order to address his mental and chemical health.  Both dad and Brendan were present for admission.  Brendan reported on admission that he felt he was being admitted because he needed to finish a program to successfully complete probation recommendations.    On admission he reported a history of polysubstance abuse that included alcohol, marijuana, opiates, benzodiazepines, hallucinogens and nicotine.  He had most recently admitted to a relapse on marijuana and on admission, he reported his last date of use was 01/17/2018.  In addition to his chemical health issues, both Brendan and his father reported a history of depression and anxiety as well as ADHD combined presentation by history diagnoses.  They reported previous treatment experiences through the  [facility] in [employer] when he was in 8th grade and [facility].  Also, a history of being seen for family therapy.  On admission they reported that Brenadn was on probation.  His 's name is Сергей Harinderga.  He was on probation for disorderly conduct and due to continued probation violations due to his chemical use part of his probation stated he needed to complete treatment.  On admission, Brendan reported willingness to abide by treatment expectations.  He stated he was motivated to complete the program to avoid further legal consequences and that on admission he stated he was ready to stop using and wanted to learn better coping skills to deal with his mental health issues.  He reported believing that he relapsed out of his inability to deal with his anxiety and that he was under a good deal of stress.  He denied suicidal ideation upon admission.  Dad stated he would be willing to be involved in the family components of the program.  They did report behavior problems that Arnulfo exhibited while under the influence included being secretive, distant, isolative and aggression when on Xanax.      ADMITTING DIAGNOSES:     1.  Unspecified trauma and stress related disorder, unspecified depression, 311.00 (F32.9).   2.  Unspecified anxiety, 300 (F41.9)   3.  Attention deficit hyperactivity disorder, combined type.   4.  Alcohol use disorder, mild, 305.00 (F10.10.   5.  Cannabis use disorder, severe, 304.30, (F12.20.   6.  Opiate use disorder, moderate, 304.00 (F11.20).      INITIAL DIMENSION SCALE RATINGS:  Dimension 1 -- 0; Dimension 2 -- 1; Dimension 3 -- 2; Dimension 4 -- 2; Dimension 5 -- 3; Dimension 6 -- 2.      PROGRAM PARTICIPATION:  While at the [facility], Brendan was involved in various tasks and assignments designed to address mental health, substance use disorders, sobriety and recovery.  He participated  in dual process groups on a daily basis, DBT skills groups, daily diary card groups, spirituality groups, on site AA meetings, recreational activities, on-site schooling which he participated in through his own virtual [employer], individual family sessions and individual counseling.      PROGRESS:   DIMENSION 1:  Acute Intoxication/Withdrawal Potential:     Brendan's risk rating remained 0 throughout the course of treatment; therefore, there were no goals identified.      DIMENSION 2:  Biomedical Conditions and Complications:   Goals in this area were for Brendan to increase his knowledge of teen health issues, to take all medications as prescribed and to increase his knowledge on the risks of smoking on his body.   Progress toward these goals:  Brendan was asked to consistently take medications as prescribed and staff would monitor compliance.  Brendan admitted on admission reported being on Adderall daily.  This was prescribed by his medical practitioner and he continued to have his medication for ADHD followed by his medical practitioner.  There were no other medications initially; however, medication was added Sertraline (Zoloft) .  He saw the program's provider, Chiquis BLOOD NP weekly.  In addition, he also reported medication effectiveness and any potential side effects to the program RN.  Arnulfo was asked to participate in a weekly teen health group  facilitated by the program RN.  Topics in these groups included the effects of drugs and alcohol on the brain and body, opiate abuse, alcohol use while pregnant, tobacco and smoking risks and cessation, STIs, HIV, AIDS, hepatitis C, pregnancy and birth control, TB, handwashing and hygiene.  Brendan participated in all these groups and was an active participant.  He was also asked to be seen in tobacco and nicotine awareness groups, discuss the effects of smoking nicotine on the body.  He was offered one-to-ones to help find ways to decrease the amount of  tobacco and nicotine use daily, which he did not take the nurse up on and was offered written materials related to tobacco and nicotine quitting.      DIMENSION 3:  Emotional/Behavioral Conditions and Complications:   Goals in this area were for Brendan to demonstrate effective management of his anxiety, depression and PTSD and develop effective strategies to deal with his anxiety and depression, ADHD and PTSD.   Progress toward these goals:  Brendan was asked to rate his mood on a daily basis with daily diary card and staff facilitated these groups and monitored his mood through the use of daily diary card as well as being seen for individual counseling sessions.  Brendan was asked to participate in 3 hours of group therapy daily.  These were dual process groups that focused on mental and substance use issues and also provided education and skills groups on DBT skills, primarily mindfulness, distress tolerance, emotional regulation and interpersonal effectiveness.  Brendan was an active participant in these groups.  He was given various verbal activities to demonstrate that he was using these skills to better manage his mental and chemical health issues.  He was given worksheets on these topics to ask him to identify how he was using these skills and how he could use these skills to assist him.  He was to report back how these were being used on a daily diary card sheet as well as in 1-to-1's with his counselor and was asked to report any behavioral changes both positive or negative as a result of using different skills. Brendan reported the skills as being quite helpful to him.  He continued to deny any self-harm or suicidal ideation throughout the course of treatment.  He did on occasion need to separate himself when he became angry or more emotionally disregulated.  He did tend to display his anxiety in the form of verbal aggression or he tended to have very circular thinking which would get him more anxious and  emotionally disregulated.  He seemed to increase his awareness that he did do this kind of thinking and that this was not helpful to him and that he remained open to finding alternatives to help slow this thinking process down so he could make less impulsive choices.  He did report the following DBT skills that he was using in his daily life were distracting with accepts, wise mind, working toward long-term goals, give and FAST skills, which were a primary focus in dealing with his interactions with his family as well as looking at his behaviors and how he wanted to develop a different sense of self and separate himself from his using behaviors.      DIMENSION 4:  Treatment Acceptance/Resistance:  Arnulfo presented to treatment as stating that he was ready to learn new skills and focus on finding better ways to deal with his mental and chemical health issues.  He did seem ambivalent about long-term changes and about change.  He met DSM V criteria for substance use disorders.   Goals in this area were for Brendan to fully engage in treatment and recovery process and begin to verbalize readiness for change and to comply with treatment expectations.   Progress toward these goals:  Brendan was asked to identify consequences related to his use.  He did complete a self-assessment and process this with staff.  He was asked to meet individually with staff weekly to review his treatment progress and treatment goals.  He was asked to be an active participant in devising treatment plan and goals for himself.  He used his individual time with staff very well and was open in his weekly 1-to-1s with staff.  He was an active participant in identifying areas that he wanted to work on and much of the focus was working on maintaining self-respect effectiveness and maintaining relationship effectiveness skills.  He has highly expressed emotion within himself and in his family.  He became more aware of that within himself and looked for  ways to maintain his self-respect effectiveness skills, even when highly emotional.  He was asked to identify ways his chemical use had been harmful which he did verbally do.  He was asked to complete a My Chemical Health Story, which he asked to not complete stating that he would prefer to focus on the benefits of recovery then look at how he already knew that his chemical use had had a negative impact.  So he was asked to report and look for the benefits of treatment and sobriety and review those which he did do weekly.      DIMENSION 5:  Relapse/Continued Problem Potential:  Brendan had a history of relapse and the lack of knowledge or coping skills related to relapse triggers or coping strategies.  Goals for him in this area were to establish and maintain abstinence from mood-altering substances, acquire the skills necessary to maintain long-term sobriety and develop an understanding of personal pattern of relapse in order to help sustain long-term recovery.   Progress toward these goals:  Brendan was asked to rate his urges to use on a daily diary card which was processed every day in daily diary card group.  He denied urges to use most of the time.  He was asked to comply with urine drug screens at staff request.  His UAs remained negative throughout the course of treatment.  He was asked to participate in a weekly relapse prevention group and come up with a weekend plan specifically designed to address any potential problems and come up with a plan to deal with any potential issues that may arise.  He was also asked to develop a relapse prevention plan which he did, and does seem to have a good plan should he choose to continue to use it.      DIMENSION 6:  Recovery Environment:  Brendan did not have a lot of sober activities that he was engaged in.  There was a loss of trust due to Brendan's use and behavior and there was ongoing conflict, parent-child relational issues and high emotion expressed within the  family.  Goals in this area were for Brendan to decrease the present conflict level with his parents and increase trust, develop sober recreational activities and develop an understanding of the relationship between his chemical use and his legal problems.   Progress toward these goals:  Brendan participated in 1 hour of recreational therapy daily, which he did seem to gain some insight into how to have fun and interact on a social level without using.  He worked on his schoolwork 2 hours a day independently.  He was attending the [employer] and they continued to follow up on his schoolwork.  The family did develop some structure and expectations at home and did complete a home contract and were asked to engage in weekly recreational activities together which they did put effort into doing.  They were asked to meet individually with staff weekly to learn communication skills, build trust.  They were provided with DBT skills education and were given information about the DBT skills.  Arnulfo and his dad did participate in weekly family sessions.  They focused on improving their relationship, attempting to learn validation skills and then validate one another.  Arnulfo verbalized often that he did not feel validated within his family.  Initially, in the family sessions Arnulfo and his father struggled to stay on topic, to not talk over one another.  Arnulfo left on occasion out of frustration.  By the transition period Arnulfo and his dad were able to remain in session, were able to communicate more effectively and were able to identify that both parties were trying in the relationship and were making effort.  There continues to be struggles.  Family therapy was recommended.  However, they chose not to follow that recommendation at transition.  Arnulfo and his dad both verbalized a strong commitment to one another and they appear to care very deeply and continued to report that they are aware that they have high emotion in their  family that they need to learn to be more respectful when they are engaged in conflict and would benefit from family therapy.  Arnulfo reported that there were some people that he knew that did not use.  This is an area as far as recreational and sober support Arnulfo will need to continue to work on in Phase II.        LEGAL:  Arnulfo was on probation.  His  was updated regularly and she did visit the program on 2 occasions.      STRENGTHS IDENTIFIED DURING THE COURSE OF TREATMENT.  Arnulfo is very artistic.  He can be very caring, he can be a strong group member.  He is a hard worker.      NEEDS IDENTIFIED DURING THE COURSE OF TREATMENT:  His need to continue to work on emotional regulation and interpersonal effectiveness skills as well as continuing to work to improve family communication.      DISCHARGE DIAGNOSES:   1.  Post-traumatic stress disorder, 309.81 (F43.10)   1.  Unspecified depression, 311.00 (F32.9)   3.  Unspecified anxiety, 300.  (F41.9)   4.  Alcohol use disorder, mild, 305.00 (F10.10)   5.  Attention deficit hyperactivity disorder, combined type, 314.01 (F90.2)   6.  Cannabis use disorder, severe, 304.30 (F12.20)   7.  Opiate use disorder, moderate, 304.00 (F11.20)      DISCHARGE DIMENSION SCALE RATINGS:  Dimension 1 -- 0; Dimension 2 -- 1; Dimension 3 -- 2; Dimension 4 -- 2; Dimension 5 -- 3; Dimension 6 -- 2.      TRANSITION PLAN:  Brendan continue to live at home, that he participate in Phase II aftercare programming at the [facility].  It is recommended that Brendan abstain from the use of all mood-altering substances, including alcohol, that he have family therapy, that he have medication management.  He has an appointment with Dr. Slaughter on 05/16/2018 at 1:00 p.m.  It is further recommended that Brendan continue with the [employer], that he receive random UAs, that a sober and supportive home environment with structure and positive family  activities remain in place, that he abstain from all mood-altering chemicals and follow his relapse prevention plan, that he continue to be closely monitored for safety, if he becomes unsafe, look at hospitalization through the [facility].  If Brendan resumes drug use, consider a CD assessment and further treatment.  If mental health symptoms worsen, consult with service providers and follow recommendations and that Brendan follow the guidelines of his probation.        PROGNOSIS:  At transition his prognosis was guarded.         This information has been disclosed to you from records protected by Federal confidentiality rules (42 CFR part 2). The Federal rules prohibit you from making any further disclosure of this information unless further disclosure is expressly permitted by the written consent of the person to whom it pertains or as otherwise permitted by 42 CFR part 2. A general authorization for the release of medical or other information is NOT sufficient for this purpose. The Federal rules restrict any use of the information to criminally investigate or prosecute any alcohol or drug abuse patient.      MICHAEL MULLIGAN             D: 2018   T: 2018   MT: DEZ      Name:     BRENDAN COLIN   MRN:      [id number]        Account:      [id number]   :      2000           Visit Date:   2018      Document: [id number]       Albendazole Counseling:  I discussed with the patient the risks of albendazole including but not limited to cytopenia, kidney damage, nausea/vomiting and severe allergy.  The patient understands that this medication is being used in an off-label manner.

## 2024-01-28 ENCOUNTER — HEALTH MAINTENANCE LETTER (OUTPATIENT)
Age: 24
End: 2024-01-28

## 2024-10-04 ENCOUNTER — OFFICE VISIT (OUTPATIENT)
Dept: URGENT CARE | Facility: URGENT CARE | Age: 24
End: 2024-10-04
Payer: COMMERCIAL

## 2024-10-04 VITALS
SYSTOLIC BLOOD PRESSURE: 151 MMHG | HEART RATE: 67 BPM | BODY MASS INDEX: 40.4 KG/M2 | WEIGHT: 273.6 LBS | DIASTOLIC BLOOD PRESSURE: 88 MMHG | OXYGEN SATURATION: 98 % | TEMPERATURE: 98.1 F

## 2024-10-04 DIAGNOSIS — R09.A2 SENSATION OF FOREIGN BODY IN THROAT: ICD-10-CM

## 2024-10-04 DIAGNOSIS — R07.0 THROAT PAIN: Primary | ICD-10-CM

## 2024-10-04 LAB — DEPRECATED S PYO AG THROAT QL EIA: NEGATIVE

## 2024-10-04 PROCEDURE — 99213 OFFICE O/P EST LOW 20 MIN: CPT | Performed by: NURSE PRACTITIONER

## 2024-10-04 PROCEDURE — 87651 STREP A DNA AMP PROBE: CPT | Performed by: NURSE PRACTITIONER

## 2024-10-04 ASSESSMENT — PAIN SCALES - GENERAL: PAINLEVEL: SEVERE PAIN (6)

## 2024-10-04 NOTE — PATIENT INSTRUCTIONS
Ibuprofen for pain and inflammation.  Maalox may be helpful for the sensation you are having. Could also try Pepcid or Prilosec both are over the counter medications in case this is a result of silent reflux.   A referral has been placed for ENT today. Follow up with them when able for further evaluation of your symptoms.  If this gets worse or new symptoms present go to ER.

## 2024-10-04 NOTE — PROGRESS NOTES
SUBJECTIVE:   Brendan Garay is a 24 year old male presenting with a chief complaint of   Chief Complaint   Patient presents with    Throat Problem     Patient woke up vomiting and is now having issues swallowing.  Feels like his wind pipe is pushing up when is trying to swallow.  Vomit was slimy snot this morning, not food.Feels like there is something stuck in his throat not coming up.  All of this just started this morning.       Past Medical History:   Diagnosis Date    Depressive disorder      Family History   Problem Relation Age of Onset    Substance Abuse Mother     Arthritis Father     Anxiety Disorder Father     Depression Father     Substance Abuse Father      Current Outpatient Medications   Medication Sig Dispense Refill    docusate sodium (COLACE) 100 MG capsule Take 2 capsules (200 mg) by mouth 2 times daily as needed for constipation 100 capsule 0    hydrocortisone, Perianal, (ANUSOL-HC) 2.5 % cream Apply to rectum bid x10 days 30 g 0    loratadine (CLARITIN) 10 MG tablet Take 1 tablet (10 mg) by mouth daily 90 tablet 1    Multiple Vitamin (MULTI-VITAMIN PO) Take 1 tablet by mouth daily.      tretinoin (RETIN-A) 0.025 % cream Spread a pea size amount into affected area topically at bedtime.  Use sunscreen SPF>20. 45 g 11    bismuth subsalicylate (PEPTO-BISMOL MAX STRENGTH) 525 MG/15ML Take 30 mLs by mouth every 2 hours as needed (diarrhea) (Patient not taking: Reported on 10/4/2024) 236 mL 3    diphenhydrAMINE (BENADRYL) 25 MG tablet Take 1 tablet (25 mg) by mouth nightly as needed (itching) (Patient not taking: Reported on 10/4/2024) 30 tablet 0    ibuprofen (ADVIL/MOTRIN) 600 MG tablet Take 1 tablet (600 mg) by mouth every 6 hours as needed for moderate pain 30 tablet 1    ibuprofen (ADVIL/MOTRIN) 800 MG tablet Take 1 tablet (800 mg) by mouth every 8 hours as needed for moderate pain (4-6) 30 tablet 0     Social History     Tobacco Use    Smoking status: Former     Current packs/day: 0.00      "Types: Cigarettes     Quit date: 1/4/2021     Years since quitting: 3.7    Smokeless tobacco: Never    Tobacco comments:     E-cig twice daily   Substance Use Topics    Alcohol use: No     Alcohol/week: 0.0 standard drinks of alcohol       OBJECTIVE  BP (!) 151/88   Pulse 67   Temp 98.1  F (36.7  C) (Tympanic)   Wt 124.1 kg (273 lb 9.6 oz)   SpO2 98%   BMI 40.40 kg/m      Physical Exam  Constitutional:       Appearance: Normal appearance.   HENT:      Right Ear: Tympanic membrane normal.      Left Ear: Tympanic membrane normal.      Nose: Nose normal.      Mouth/Throat:      Mouth: Mucous membranes are moist.      Pharynx: Posterior oropharyngeal erythema present.   Cardiovascular:      Rate and Rhythm: Normal rate and regular rhythm.      Heart sounds: Normal heart sounds.   Pulmonary:      Effort: Pulmonary effort is normal.      Breath sounds: Normal breath sounds.   Musculoskeletal:      Cervical back: Neck supple.   Skin:     General: Skin is warm and dry.   Neurological:      Mental Status: He is alert.   Psychiatric:         Mood and Affect: Mood normal.     Strep: negative; await PCR    GI cocktail given in clinic which helped some but pt reported \"now it just feels numb.\"   We discussed many possible causes for the throat including but not limited to silent reflux, irritation from vomiting, allergies (post nasal drip), thyroid nodules or other growths in the area. A referral to ENT was placed in the event this isn't resolving with conservative measures listed below.    ASSESSMENT:  1. Throat pain    - lidocaine (viscous) (XYLOCAINE) 2 % 15 mL, alum & mag hydroxide-simethicone (MAALOX) 15 mL GI Cocktail  - Streptococcus A Rapid Screen w/Reflex to PCR - Clinic Collect  - Group A Streptococcus PCR Throat Swab    2. Sensation of foreign body in throat    - Adult ENT  Referral; Future        PLAN:  Ibuprofen for pain and inflammation.  Maalox may be helpful for the sensation you are having. Could " also try Pepcid or Prilosec both are over the counter medications in case this is a result of silent reflux.   A referral has been placed for ENT today. Follow up with them when able for further evaluation of your symptoms.  If this gets worse or new symptoms present go to ER.

## 2024-10-05 LAB — GROUP A STREP BY PCR: NOT DETECTED

## 2025-02-02 ENCOUNTER — HEALTH MAINTENANCE LETTER (OUTPATIENT)
Age: 25
End: 2025-02-02